# Patient Record
Sex: MALE | Race: BLACK OR AFRICAN AMERICAN | NOT HISPANIC OR LATINO | Employment: STUDENT | URBAN - METROPOLITAN AREA
[De-identification: names, ages, dates, MRNs, and addresses within clinical notes are randomized per-mention and may not be internally consistent; named-entity substitution may affect disease eponyms.]

---
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
## 2023-01-04 ENCOUNTER — OFFICE VISIT (OUTPATIENT)
Dept: NEUROLOGY | Facility: CLINIC | Age: 18
End: 2023-01-04
Payer: MEDICAID

## 2023-01-04 VITALS
HEIGHT: 70 IN | SYSTOLIC BLOOD PRESSURE: 110 MMHG | WEIGHT: 146.19 LBS | BODY MASS INDEX: 20.93 KG/M2 | DIASTOLIC BLOOD PRESSURE: 61 MMHG | HEART RATE: 56 BPM

## 2023-01-04 DIAGNOSIS — F84.0 AUTISM DISORDER: ICD-10-CM

## 2023-01-04 DIAGNOSIS — G43.009 MIGRAINE WITHOUT AURA AND WITHOUT STATUS MIGRAINOSUS, NOT INTRACTABLE: Primary | ICD-10-CM

## 2023-01-04 DIAGNOSIS — G43.019 INTRACTABLE MIGRAINE WITHOUT AURA AND WITHOUT STATUS MIGRAINOSUS: ICD-10-CM

## 2023-01-04 PROCEDURE — 3074F PR MOST RECENT SYSTOLIC BLOOD PRESSURE < 130 MM HG: ICD-10-PCS | Mod: CPTII,,, | Performed by: NEUROLOGICAL SURGERY

## 2023-01-04 PROCEDURE — 99214 PR OFFICE/OUTPT VISIT, EST, LEVL IV, 30-39 MIN: ICD-10-PCS | Mod: S$PBB,,, | Performed by: NEUROLOGICAL SURGERY

## 2023-01-04 PROCEDURE — 1159F MED LIST DOCD IN RCRD: CPT | Mod: CPTII,,, | Performed by: NEUROLOGICAL SURGERY

## 2023-01-04 PROCEDURE — 99999 PR PBB SHADOW E&M-EST. PATIENT-LVL III: ICD-10-PCS | Mod: PBBFAC,,, | Performed by: NEUROLOGICAL SURGERY

## 2023-01-04 PROCEDURE — 99214 OFFICE O/P EST MOD 30 MIN: CPT | Mod: S$PBB,,, | Performed by: NEUROLOGICAL SURGERY

## 2023-01-04 PROCEDURE — 3008F PR BODY MASS INDEX (BMI) DOCUMENTED: ICD-10-PCS | Mod: CPTII,,, | Performed by: NEUROLOGICAL SURGERY

## 2023-01-04 PROCEDURE — 99999 PR PBB SHADOW E&M-EST. PATIENT-LVL III: CPT | Mod: PBBFAC,,, | Performed by: NEUROLOGICAL SURGERY

## 2023-01-04 PROCEDURE — 1159F PR MEDICATION LIST DOCUMENTED IN MEDICAL RECORD: ICD-10-PCS | Mod: CPTII,,, | Performed by: NEUROLOGICAL SURGERY

## 2023-01-04 PROCEDURE — 3078F PR MOST RECENT DIASTOLIC BLOOD PRESSURE < 80 MM HG: ICD-10-PCS | Mod: CPTII,,, | Performed by: NEUROLOGICAL SURGERY

## 2023-01-04 PROCEDURE — 3074F SYST BP LT 130 MM HG: CPT | Mod: CPTII,,, | Performed by: NEUROLOGICAL SURGERY

## 2023-01-04 PROCEDURE — 3008F BODY MASS INDEX DOCD: CPT | Mod: CPTII,,, | Performed by: NEUROLOGICAL SURGERY

## 2023-01-04 PROCEDURE — 3078F DIAST BP <80 MM HG: CPT | Mod: CPTII,,, | Performed by: NEUROLOGICAL SURGERY

## 2023-01-04 PROCEDURE — 99213 OFFICE O/P EST LOW 20 MIN: CPT | Mod: PBBFAC | Performed by: NEUROLOGICAL SURGERY

## 2023-01-04 RX ORDER — ATOGEPANT 60 MG/1
60 TABLET ORAL DAILY
Qty: 30 TABLET | Refills: 5 | Status: SHIPPED | OUTPATIENT
Start: 2023-01-04 | End: 2023-02-24

## 2023-01-04 RX ORDER — SUMATRIPTAN SUCCINATE 100 MG/1
100 TABLET ORAL DAILY PRN
Qty: 10 TABLET | Refills: 4 | Status: SHIPPED | OUTPATIENT
Start: 2023-01-04 | End: 2023-02-24

## 2023-01-05 NOTE — PROGRESS NOTES
Chief Complaint   Patient presents with    Headache    Seizures        Clau Hernandez is a 17 y.o. male with a history of multiple medical diagnoses as listed below that presents to establish care for his history of seizure.  He was previously been seen pediatric Neurology Clinic.  His medications have been adjusted over time and get him relief from his migraine headaches.  He needs abortive therapy, namely sumatriptan for the last several months as his abortive therapy whenever he has had a significant headache.  The patient says that he feels like his headaches have been triggered by the weather.  His mother, who accompanies him to the visit and provides most of the history, says that he perseverates on the weather and can not say for sure she is noticed any significant uptake in his headaches during weather changes.  Previously there was discussion about using magnesium and riboflavin as a preventative regimen.  His mother says that she also regularly gives him sumatriptan once to twice a week on average to try to help to reduce his onset of headaches as well.  He is never been on any other abortive therapy.  He was referred to adult Neurology due to his age.  In the previous visits he also describes having staring spells.  These seem to have have been much less than in the past.  Workup has included EEG but has not been remarkable.  Family has not noticed the same episodes that they did previously..     PAST MEDICAL HISTORY:  Past Medical History:   Diagnosis Date    ADHD (attention deficit hyperactivity disorder)     Autism     Seizure        PAST SURGICAL HISTORY:  Past Surgical History:   Procedure Laterality Date    CIRCUMCISION      OPEN REDUCTION AND INTERNAL FIXATION (ORIF) OF INJURY OF FOOT Right 2/26/2020    Procedure: ORIF, FOOT;  Surgeon: Manisha Martínez MD;  Location: [facility] OR [address] [place] [address];  Service: Orthopedics;  Laterality: Right;  ORIF R 4th toe fracture  supine  bone-foam  hemaclear foot and ankle  tourniquet  acutrak screw  postop heel-bearing shoe    WILL NOT BE A GOOD CANDIDATE FOR PREOP BLOCK (AUTISM)       SOCIAL HISTORY:  Social History     Socioeconomic History    Marital status: Single   Tobacco Use    Smoking status: Passive Smoke Exposure - Never Smoker    Smokeless tobacco: Never   Substance and Sexual Activity    Alcohol use: No    Drug use: No    Sexual activity: Never       FAMILY HISTORY:  Family History   Problem Relation Age of Onset    Hypertension Mother     Migraines Mother     Hypertension Maternal Grandmother     Early death Maternal Grandmother     Hypertension Paternal Grandmother     Other Paternal Grandfather        ALLERGIES AND MEDICATIONS: updated and reviewed.  Review of patient's allergies indicates:  No Known Allergies  Current Outpatient Medications   Medication Sig Dispense Refill    atogepant (QULIPTA) 60 mg Tab Take 60 mg by mouth once daily. 30 tablet 5    benzocaine-menthoL 6-10 mg lozenge Take 1 lozenge by mouth every 2 (two) hours as needed for Pain (sore throat). 18 tablet 0    cetirizine (ZYRTEC) 10 MG tablet Take 1 tablet (10 mg total) by mouth once daily. TK 1 T PO  D 30 tablet 3    cetirizine (ZYRTEC) 10 MG tablet Take 1 tablet (10 mg total) by mouth daily as needed for Allergies or Rhinitis. 30 tablet 0    CLEARLAX 17 gram/dose powder       cloNIDine (CATAPRES) 0.3 MG tablet Take 1 tablet (0.3 mg total) by mouth 2 (two) times daily. 60 tablet 11    dexmethylphenidate (FOCALIN XR) 15 MG 24 hr capsule Take 1 capsule (15 mg total) by mouth once daily. 30 capsule 0    fluticasone propionate (FLONASE) 50 mcg/actuation nasal spray 1 spray (50 mcg total) by Each Nostril route 2 (two) times daily as needed for Rhinitis. 15 g 0    ibuprofen (ADVIL,MOTRIN) 600 MG tablet Take 1 tablet (600 mg total) by mouth every 6 (six) hours as needed for Pain or Temperature greater than. 20 tablet 0    mirtazapine (REMERON) 7.5 MG Tab Take 1 tablet (7.5 mg total) by mouth every evening.  30 tablet 11    naproxen (NAPROSYN) 500 MG tablet Take 1 tablet (500 mg total) by mouth every 12 (twelve) hours as needed (headache. No more than 2 doses a week). 60 tablet 2    oxybutynin (DITROPAN) 5 MG Tab Take 1 tablet (5 mg total) by mouth 3 (three) times daily. 90 tablet 11    oxybutynin (DITROPAN) 5 mg/5 mL syrup Take 5 mLs (5 mg total) by mouth 2 (two) times daily. 300 mL 1    polyethylene glycol (GLYCOLAX) 17 gram PwPk Take 17 g by mouth once daily. 100 packet 4    sumatriptan (IMITREX) 100 MG tablet Take 1 tablet (100 mg total) by mouth daily as needed for Migraine (no more than 3 times a week). 10 tablet 4     No current facility-administered medications for this visit.       Review of Systems   Constitutional:  Negative for activity change, fatigue and unexpected weight change.   HENT:  Negative for trouble swallowing and voice change.    Eyes:  Negative for photophobia, pain and visual disturbance.   Respiratory:  Negative for apnea and shortness of breath.    Cardiovascular:  Negative for chest pain and palpitations.   Gastrointestinal:  Negative for constipation, nausea and vomiting.   Genitourinary:  Negative for difficulty urinating.   Musculoskeletal:  Negative for arthralgias, back pain, gait problem, myalgias and neck pain.   Skin:  Negative for color change and rash.   Neurological:  Negative for dizziness, seizures, syncope, speech difficulty, weakness, light-headedness, numbness and headaches.   Psychiatric/Behavioral:  Negative for agitation, behavioral problems and confusion.      Neurologic Exam     Mental Status   Oriented to person, place, and time.   Oriented to person.   Attention: decreased. Concentration: decreased.   Speech: speech is normal   Level of consciousness: alert    Cranial Nerves     CN II   Right visual field deficit: none  Left visual field deficit: none     CN III, IV, VI   Pupils are equal, round, and reactive to light.  Extraocular motions are normal.   Right pupil:  "Size: 3 mm. Shape: regular.   Left pupil: Size: 3 mm. Shape: regular.   CN III: no CN III palsy  CN VI: no CN VI palsy  Nystagmus: none   Diplopia: none  Ophthalmoparesis: none  Upgaze: normal  Downgaze: normal  Conjugate gaze: present    CN VII   Facial expression full, symmetric.   Right facial weakness: none  Left facial weakness: none    CN VIII   CN VIII normal.     CN XI   CN XI normal.     CN XII   CN XII normal.   Tongue deviation: none    Motor Exam   Muscle bulk: normal  Overall muscle tone: normal  Right arm tone: normal  Left arm tone: normal  Right leg tone: normal  Left leg tone: normal    Gait, Coordination, and Reflexes     Gait  Gait: normal    Coordination   Finger to nose coordination: normal    Tremor   Resting tremor: absent    Physical Exam  Constitutional:       Appearance: He is well-developed.   HENT:      Head: Normocephalic and atraumatic.   Eyes:      Extraocular Movements: EOM normal.      Pupils: Pupils are equal, round, and reactive to light.   Pulmonary:      Effort: Pulmonary effort is normal. No respiratory distress.   Musculoskeletal:         General: Normal range of motion.   Neurological:      Mental Status: He is alert and oriented to person, place, and time.      Coordination: Finger-Nose-Finger Test normal.      Gait: Gait is intact.   Psychiatric:         Speech: Speech normal.         Behavior: Behavior normal.       Vitals:    01/04/23 0857   BP: 110/61   Pulse: (!) 56   Weight: 66.3 kg (146 lb 2.6 oz)   Height: 5' 9.75" (1.772 m)       Assessment & Plan:    Problem List Items Addressed This Visit       Intractable migraine without aura and without status migrainosus    Relevant Medications    sumatriptan (IMITREX) 100 MG tablet     Other Visit Diagnoses       Migraine without aura and without status migrainosus, not intractable    -  Primary    Relevant Medications    atogepant (QULIPTA) 60 mg Tab    Autism disorder                Follow-up: No follow-ups on file.    This " note was done with the assistance of voice recognition software. Some errors may be present after proofreading.

## 2023-01-09 ENCOUNTER — PATIENT MESSAGE (OUTPATIENT)
Dept: PEDIATRICS | Facility: CLINIC | Age: 18
End: 2023-01-09
Payer: MEDICAID

## 2023-01-09 DIAGNOSIS — G47.9 SLEEP DISTURBANCE: Primary | ICD-10-CM

## 2023-01-09 RX ORDER — MIRTAZAPINE 15 MG/1
15 TABLET, FILM COATED ORAL NIGHTLY
Qty: 30 TABLET | Refills: 11 | Status: SHIPPED | OUTPATIENT
Start: 2023-01-09 | End: 2024-01-09

## 2023-01-10 ENCOUNTER — TELEPHONE (OUTPATIENT)
Dept: NEUROLOGY | Facility: CLINIC | Age: 18
End: 2023-01-10
Payer: MEDICAID

## 2023-01-10 NOTE — TELEPHONE ENCOUNTER
----- Message from Nano Rojas sent at 1/10/2023 12:52 PM CST -----  Type:  Pharmacy Calling to Clarify an RX    Name of Caller: Marleen Medina    Pharmacy Name: .  [facility] [address] [facility]  [address] [facility] [address]  Phone: [phone] Fax: [phone]    Prescription Name:atogepant (QULIPTA) 60 mg Tab    What do they need to clarify? Need Prior authorization    Can you be contacted via [facility]?cALL    Best Call Back Number:  [phone]    Additional Information:       Left message on [employer]. Working on PA

## 2023-01-24 ENCOUNTER — PATIENT MESSAGE (OUTPATIENT)
Dept: PEDIATRICS | Facility: CLINIC | Age: 18
End: 2023-01-24
Payer: MEDICAID

## 2023-02-11 ENCOUNTER — TELEPHONE (OUTPATIENT)
Dept: PEDIATRICS | Facility: CLINIC | Age: 18
End: 2023-02-11
Payer: MEDICAID

## 2023-02-11 NOTE — TELEPHONE ENCOUNTER
Spoke with mom and attempted to schedule a well child/90L form completion. Next well open is 03-. Mom declined and needs something sooner for this month

## 2023-02-20 ENCOUNTER — PATIENT MESSAGE (OUTPATIENT)
Dept: NEUROLOGY | Facility: CLINIC | Age: 18
End: 2023-02-20
Payer: MEDICAID

## 2023-02-20 ENCOUNTER — PATIENT MESSAGE (OUTPATIENT)
Dept: PEDIATRICS | Facility: CLINIC | Age: 18
End: 2023-02-20
Payer: MEDICAID

## 2023-02-23 RX ORDER — TOPIRAMATE 25 MG/1
TABLET ORAL
Qty: 42 TABLET | Refills: 0 | Status: SHIPPED | OUTPATIENT
Start: 2023-02-23 | End: 2023-03-16

## 2023-02-23 RX ORDER — UBROGEPANT 100 MG/1
100 TABLET ORAL ONCE AS NEEDED
Qty: 16 TABLET | Refills: 11 | Status: SHIPPED | OUTPATIENT
Start: 2023-02-23 | End: 2023-02-23

## 2023-02-24 ENCOUNTER — OFFICE VISIT (OUTPATIENT)
Dept: PEDIATRICS | Facility: CLINIC | Age: 18
End: 2023-02-24
Payer: MEDICAID

## 2023-02-24 VITALS
HEART RATE: 80 BPM | WEIGHT: 154.19 LBS | TEMPERATURE: 98 F | SYSTOLIC BLOOD PRESSURE: 139 MMHG | DIASTOLIC BLOOD PRESSURE: 84 MMHG | HEIGHT: 70 IN | BODY MASS INDEX: 22.07 KG/M2

## 2023-02-24 DIAGNOSIS — Z02.89 ENCOUNTER FOR COMPLETION OF FORM WITH PATIENT: Primary | ICD-10-CM

## 2023-02-24 DIAGNOSIS — J45.909 ASTHMA WITH ALLERGIC RHINITIS, UNSPECIFIED ASTHMA SEVERITY, UNSPECIFIED WHETHER COMPLICATED: ICD-10-CM

## 2023-02-24 PROCEDURE — 3008F BODY MASS INDEX DOCD: CPT | Mod: CPTII,S$GLB,, | Performed by: PEDIATRICS

## 2023-02-24 PROCEDURE — 1159F PR MEDICATION LIST DOCUMENTED IN MEDICAL RECORD: ICD-10-PCS | Mod: CPTII,S$GLB,, | Performed by: PEDIATRICS

## 2023-02-24 PROCEDURE — 1159F MED LIST DOCD IN RCRD: CPT | Mod: CPTII,S$GLB,, | Performed by: PEDIATRICS

## 2023-02-24 PROCEDURE — 3075F PR MOST RECENT SYSTOLIC BLOOD PRESS GE 130-139MM HG: ICD-10-PCS | Mod: CPTII,S$GLB,, | Performed by: PEDIATRICS

## 2023-02-24 PROCEDURE — 3079F PR MOST RECENT DIASTOLIC BLOOD PRESSURE 80-89 MM HG: ICD-10-PCS | Mod: CPTII,S$GLB,, | Performed by: PEDIATRICS

## 2023-02-24 PROCEDURE — 99214 PR OFFICE/OUTPT VISIT, EST, LEVL IV, 30-39 MIN: ICD-10-PCS | Mod: S$GLB,,, | Performed by: PEDIATRICS

## 2023-02-24 PROCEDURE — 3008F PR BODY MASS INDEX (BMI) DOCUMENTED: ICD-10-PCS | Mod: CPTII,S$GLB,, | Performed by: PEDIATRICS

## 2023-02-24 PROCEDURE — 99214 OFFICE O/P EST MOD 30 MIN: CPT | Mod: S$GLB,,, | Performed by: PEDIATRICS

## 2023-02-24 PROCEDURE — 3075F SYST BP GE 130 - 139MM HG: CPT | Mod: CPTII,S$GLB,, | Performed by: PEDIATRICS

## 2023-02-24 PROCEDURE — 3079F DIAST BP 80-89 MM HG: CPT | Mod: CPTII,S$GLB,, | Performed by: PEDIATRICS

## 2023-02-24 RX ORDER — FLUTICASONE PROPIONATE 50 MCG
1 SPRAY, SUSPENSION (ML) NASAL 2 TIMES DAILY PRN
Qty: 15 G | Refills: 0 | Status: SHIPPED | OUTPATIENT
Start: 2023-02-24

## 2023-02-24 RX ORDER — LEVOCETIRIZINE DIHYDROCHLORIDE 5 MG/1
5 TABLET, FILM COATED ORAL NIGHTLY
Qty: 30 TABLET | Refills: 11 | Status: SHIPPED | OUTPATIENT
Start: 2023-02-24 | End: 2024-02-24

## 2023-02-24 NOTE — PROGRESS NOTES
Subjective:     History of Present Illness:  Clau Hernandez is a 17 y.o. male who presents to the clinic today for 90 L form     History was provided by the mother. Pt was last seen on 12/15/2022.  Clau complains of being here for 90-L form completion. No changes except a few medication adjustments. Otherwise, same as last time we filled out the form    Review of Systems   Constitutional:  Negative for activity change, appetite change and fever.   HENT:  Negative for congestion, ear pain, rhinorrhea and sore throat.    Respiratory:  Negative for cough.    Gastrointestinal:  Negative for diarrhea and vomiting.   Genitourinary:  Negative for decreased urine volume.   Skin: Negative.  Negative for rash.   Neurological:  Negative for headaches.     Objective:     Physical Exam  Vitals reviewed.   Constitutional:       Appearance: Normal appearance.   HENT:      Head: Normocephalic and atraumatic.      Right Ear: External ear normal.      Left Ear: External ear normal.      Nose: Nose normal.      Mouth/Throat:      Mouth: Mucous membranes are moist.   Eyes:      Conjunctiva/sclera: Conjunctivae normal.   Cardiovascular:      Rate and Rhythm: Normal rate and regular rhythm.   Pulmonary:      Effort: Pulmonary effort is normal. No respiratory distress.   Abdominal:      Palpations: Abdomen is soft.   Musculoskeletal:         General: Normal range of motion.      Cervical back: Normal range of motion.   Skin:     Capillary Refill: Capillary refill takes less than 2 seconds.   Neurological:      Mental Status: He is alert and oriented to person, place, and time.   Psychiatric:         Mood and Affect: Mood normal.         Behavior: Behavior normal.       Assessment and Plan:     Asthma with allergic rhinitis, unspecified asthma severity, unspecified whether complicated  -     levocetirizine (XYZAL) 5 MG tablet; Take 1 tablet (5 mg total) by mouth every evening.  Dispense: 30 tablet; Refill: 11  -     fluticasone  propionate (FLONASE) 50 mcg/actuation nasal spray; 1 spray (50 mcg total) by Each Nostril route 2 (two) times daily as needed for Rhinitis.  Dispense: 15 g; Refill: 0          No follow-ups on file.

## 2023-03-06 ENCOUNTER — PATIENT MESSAGE (OUTPATIENT)
Dept: NEUROLOGY | Facility: CLINIC | Age: 18
End: 2023-03-06
Payer: MEDICAID

## 2023-03-16 ENCOUNTER — PATIENT MESSAGE (OUTPATIENT)
Dept: PEDIATRICS | Facility: CLINIC | Age: 18
End: 2023-03-16
Payer: MEDICAID

## 2023-03-17 ENCOUNTER — TELEPHONE (OUTPATIENT)
Dept: NEUROLOGY | Facility: CLINIC | Age: 18
End: 2023-03-17
Payer: MEDICAID

## 2023-03-17 ENCOUNTER — OFFICE VISIT (OUTPATIENT)
Dept: NEUROLOGY | Facility: CLINIC | Age: 18
End: 2023-03-17
Payer: MEDICAID

## 2023-03-17 VITALS
WEIGHT: 151 LBS | BODY MASS INDEX: 21.62 KG/M2 | DIASTOLIC BLOOD PRESSURE: 99 MMHG | HEIGHT: 70 IN | SYSTOLIC BLOOD PRESSURE: 145 MMHG | HEART RATE: 71 BPM

## 2023-03-17 DIAGNOSIS — G43.019 INTRACTABLE MIGRAINE WITHOUT AURA AND WITHOUT STATUS MIGRAINOSUS: Primary | ICD-10-CM

## 2023-03-17 DIAGNOSIS — G43.009 MIGRAINE WITHOUT AURA AND WITHOUT STATUS MIGRAINOSUS, NOT INTRACTABLE: ICD-10-CM

## 2023-03-17 PROCEDURE — 3008F BODY MASS INDEX DOCD: CPT | Mod: CPTII,,, | Performed by: NEUROLOGICAL SURGERY

## 2023-03-17 PROCEDURE — 99999 PR PBB SHADOW E&M-EST. PATIENT-LVL III: ICD-10-PCS | Mod: PBBFAC,,, | Performed by: NEUROLOGICAL SURGERY

## 2023-03-17 PROCEDURE — 3008F PR BODY MASS INDEX (BMI) DOCUMENTED: ICD-10-PCS | Mod: CPTII,,, | Performed by: NEUROLOGICAL SURGERY

## 2023-03-17 PROCEDURE — 99214 PR OFFICE/OUTPT VISIT, EST, LEVL IV, 30-39 MIN: ICD-10-PCS | Mod: S$PBB,,, | Performed by: NEUROLOGICAL SURGERY

## 2023-03-17 PROCEDURE — 1159F MED LIST DOCD IN RCRD: CPT | Mod: CPTII,,, | Performed by: NEUROLOGICAL SURGERY

## 2023-03-17 PROCEDURE — 99999 PR PBB SHADOW E&M-EST. PATIENT-LVL III: CPT | Mod: PBBFAC,,, | Performed by: NEUROLOGICAL SURGERY

## 2023-03-17 PROCEDURE — 1159F PR MEDICATION LIST DOCUMENTED IN MEDICAL RECORD: ICD-10-PCS | Mod: CPTII,,, | Performed by: NEUROLOGICAL SURGERY

## 2023-03-17 PROCEDURE — 3080F DIAST BP >= 90 MM HG: CPT | Mod: CPTII,,, | Performed by: NEUROLOGICAL SURGERY

## 2023-03-17 PROCEDURE — 99213 OFFICE O/P EST LOW 20 MIN: CPT | Mod: PBBFAC | Performed by: NEUROLOGICAL SURGERY

## 2023-03-17 PROCEDURE — 3077F PR MOST RECENT SYSTOLIC BLOOD PRESSURE >= 140 MM HG: ICD-10-PCS | Mod: CPTII,,, | Performed by: NEUROLOGICAL SURGERY

## 2023-03-17 PROCEDURE — 99214 OFFICE O/P EST MOD 30 MIN: CPT | Mod: S$PBB,,, | Performed by: NEUROLOGICAL SURGERY

## 2023-03-17 PROCEDURE — 3077F SYST BP >= 140 MM HG: CPT | Mod: CPTII,,, | Performed by: NEUROLOGICAL SURGERY

## 2023-03-17 PROCEDURE — 3080F PR MOST RECENT DIASTOLIC BLOOD PRESSURE >= 90 MM HG: ICD-10-PCS | Mod: CPTII,,, | Performed by: NEUROLOGICAL SURGERY

## 2023-03-17 RX ORDER — SUMATRIPTAN SUCCINATE 100 MG/1
100 TABLET ORAL
Qty: 12 TABLET | Refills: 5 | Status: SHIPPED | OUTPATIENT
Start: 2023-03-17 | End: 2023-04-16

## 2023-03-17 RX ORDER — TOPIRAMATE 25 MG/1
TABLET ORAL
Qty: 42 TABLET | Refills: 0 | Status: SHIPPED | OUTPATIENT
Start: 2023-03-17 | End: 2023-04-07

## 2023-03-17 NOTE — PROGRESS NOTES
Chief Complaint   Patient presents with    Headache        Clau Hernandez is a 17 y.o. male with a history of multiple medical diagnoses as listed below that presents to establish care for his history of seizure.  He was previously been seen pediatric Neurology Clinic.  His medications have been adjusted over time and get him relief from his migraine headaches.  He needs abortive therapy, namely sumatriptan for the last several months as his abortive therapy whenever he has had a significant headache.  The patient says that he feels like his headaches have been triggered by the weather.  His mother, who accompanies him to the visit and provides most of the history, says that he perseverates on the weather and can not say for sure she is noticed any significant uptake in his headaches during weather changes.  Previously there was discussion about using magnesium and riboflavin as a preventative regimen.  His mother says that she also regularly gives him sumatriptan once to twice a week on average to try to help to reduce his onset of headaches as well.  He is never been on any other abortive therapy.  He was referred to adult Neurology due to his age.  In the previous visits he also describes having staring spells.  These seem to have have been much less than in the past.  Workup has included EEG but has not been remarkable.  Family has not noticed the same episodes that they did previously.    Interval History  03/17/2021  Overall he is doing about the same since he was last seen in clinic.  He has not had any significant improvement in his headaches.  He still complains of headaches fairly often.  Medications have been tolerated without any complaints of side effects..     PAST MEDICAL HISTORY:  Past Medical History:   Diagnosis Date    ADHD (attention deficit hyperactivity disorder)     Autism     Seizure        PAST SURGICAL HISTORY:  Past Surgical History:   Procedure Laterality Date    CIRCUMCISION      OPEN  REDUCTION AND INTERNAL FIXATION (ORIF) OF INJURY OF FOOT Right 2/26/2020    Procedure: ORIF, FOOT;  Surgeon: Manisha Martínez MD;  Location: [facility] OR [address] [place] [address];  Service: Orthopedics;  Laterality: Right;  ORIF R 4th toe fracture  supine  bone-foam  hemaclear foot and ankle tourniquet  acutrak screw  postop heel-bearing shoe    WILL NOT BE A GOOD CANDIDATE FOR PREOP BLOCK (AUTISM)       SOCIAL HISTORY:  Social History     Socioeconomic History    Marital status: Single   Tobacco Use    Smoking status: Passive Smoke Exposure - Never Smoker    Smokeless tobacco: Never   Substance and Sexual Activity    Alcohol use: No    Drug use: No    Sexual activity: Never       FAMILY HISTORY:  Family History   Problem Relation Age of Onset    Hypertension Mother     Migraines Mother     Hypertension Maternal Grandmother     Early death Maternal Grandmother     Hypertension Paternal Grandmother     Other Paternal Grandfather        ALLERGIES AND MEDICATIONS: updated and reviewed.  Review of patient's allergies indicates:  No Known Allergies  Current Outpatient Medications   Medication Sig Dispense Refill    cloNIDine (CATAPRES) 0.3 MG tablet Take 1 tablet (0.3 mg total) by mouth 2 (two) times daily. 60 tablet 11    dexmethylphenidate (FOCALIN XR) 15 MG 24 hr capsule Take 1 capsule (15 mg total) by mouth once daily. 30 capsule 0    fluticasone propionate (FLONASE) 50 mcg/actuation nasal spray 1 spray (50 mcg total) by Each Nostril route 2 (two) times daily as needed for Rhinitis. 15 g 0    ibuprofen (ADVIL,MOTRIN) 600 MG tablet Take 1 tablet (600 mg total) by mouth every 6 (six) hours as needed for Pain or Temperature greater than. 20 tablet 0    levocetirizine (XYZAL) 5 MG tablet Take 1 tablet (5 mg total) by mouth every evening. 30 tablet 11    mirtazapine (REMERON) 15 MG tablet Take 1 tablet (15 mg total) by mouth every evening. 30 tablet 11    naproxen (NAPROSYN) 500 MG tablet Take 1 tablet (500 mg total) by mouth every 12  (twelve) hours as needed (headache. No more than 2 doses a week). 60 tablet 2    oxybutynin (DITROPAN) 5 MG Tab Take 1 tablet (5 mg total) by mouth 3 (three) times daily. 90 tablet 11    polyethylene glycol (GLYCOLAX) 17 gram PwPk Take 17 g by mouth once daily. 100 packet 4    topiramate (TOPAMAX) 25 MG tablet One tablet PO for one week; then two tablets PO for one week; then three tablets PO for one week 42 tablet 0     No current facility-administered medications for this visit.       Review of Systems   Constitutional:  Negative for activity change, fatigue and unexpected weight change.   HENT:  Negative for trouble swallowing and voice change.    Eyes:  Negative for photophobia, pain and visual disturbance.   Respiratory:  Negative for apnea and shortness of breath.    Cardiovascular:  Negative for chest pain and palpitations.   Gastrointestinal:  Negative for constipation, nausea and vomiting.   Genitourinary:  Negative for difficulty urinating.   Musculoskeletal:  Negative for arthralgias, back pain, gait problem, myalgias and neck pain.   Skin:  Negative for color change and rash.   Neurological:  Negative for dizziness, seizures, syncope, speech difficulty, weakness, light-headedness, numbness and headaches.   Psychiatric/Behavioral:  Negative for agitation, behavioral problems and confusion.      Neurologic Exam     Mental Status   Oriented to person, place, and time.   Oriented to person.   Attention: decreased. Concentration: decreased.   Speech: speech is normal   Level of consciousness: alert    Cranial Nerves     CN II   Right visual field deficit: none  Left visual field deficit: none     CN III, IV, VI   Pupils are equal, round, and reactive to light.  Extraocular motions are normal.   Right pupil: Size: 3 mm. Shape: regular.   Left pupil: Size: 3 mm. Shape: regular.   CN III: no CN III palsy  CN VI: no CN VI palsy  Nystagmus: none   Diplopia: none  Ophthalmoparesis: none  Upgaze: normal  Downgaze:  "normal  Conjugate gaze: present    CN VII   Facial expression full, symmetric.   Right facial weakness: none  Left facial weakness: none    CN VIII   CN VIII normal.     CN XI   CN XI normal.     CN XII   CN XII normal.   Tongue deviation: none    Motor Exam   Muscle bulk: normal  Overall muscle tone: normal  Right arm tone: normal  Left arm tone: normal  Right leg tone: normal  Left leg tone: normal    Gait, Coordination, and Reflexes     Gait  Gait: normal    Coordination   Finger to nose coordination: normal    Tremor   Resting tremor: absent    Physical Exam  Constitutional:       Appearance: He is well-developed.   HENT:      Head: Normocephalic and atraumatic.   Eyes:      Extraocular Movements: EOM normal.      Pupils: Pupils are equal, round, and reactive to light.   Pulmonary:      Effort: Pulmonary effort is normal. No respiratory distress.   Musculoskeletal:         General: Normal range of motion.   Neurological:      Mental Status: He is alert and oriented to person, place, and time.      Coordination: Finger-Nose-Finger Test normal.      Gait: Gait is intact.   Psychiatric:         Speech: Speech normal.         Behavior: Behavior normal.       Vitals:    03/17/23 1024   BP: (!) 145/99   Pulse: 71   Weight: 68.5 kg (151 lb 0.2 oz)   Height: 5' 10" (1.778 m)       Assessment & Plan:    Problem List Items Addressed This Visit    None        Follow-up: No follow-ups on file.    This note was done with the assistance of voice recognition software. Some errors may be present after proofreading.          "

## 2023-03-17 NOTE — TELEPHONE ENCOUNTER
----- Message from Em Louis MA sent at 3/17/2023 11:21 AM CDT -----  Type: Patient Call Back    Who called:[facility]     What is the request in detail: needs to clarify instructions for this medication..     topiramate (TOPAMAX) 25 MG tablet      Can the clinic reply by [facility]?No    Would the patient rather a call back or a response via [facility]? yes    Best call back number: [phone]      Spoke to pharmacy, clarified prescription

## 2023-05-17 ENCOUNTER — PATIENT MESSAGE (OUTPATIENT)
Dept: PEDIATRICS | Facility: CLINIC | Age: 18
End: 2023-05-17
Payer: MEDICAID

## 2023-05-24 ENCOUNTER — CLINICAL SUPPORT (OUTPATIENT)
Dept: REHABILITATION | Facility: HOSPITAL | Age: 18
End: 2023-05-24
Attending: PEDIATRICS
Payer: MEDICAID

## 2023-05-24 DIAGNOSIS — F84.0 AUTISM DISORDER: ICD-10-CM

## 2023-05-24 DIAGNOSIS — F84.0 AUTISM: Primary | ICD-10-CM

## 2023-05-24 PROCEDURE — 92523 SPEECH SOUND LANG COMPREHEN: CPT | Mod: PN

## 2023-05-24 NOTE — PLAN OF CARE
"[facility]  Pediatric Speech Therapy Initial Evaluation       Date: 2023    Patient Name: Clau Hernandez  MRN: [id number]  Therapy Diagnosis:   Encounter Diagnosis   Name Primary?    Autism disorder       Physician: Cali Rivera MD   Physician Orders: Evaluate and treat   Medical Diagnosis: Autism   Age: 17 y.o.    Visit # / Visits Authorized:     Date of Evaluation: 2023    Plan of Care Expiration Date: 2023   Authorization Date: 2022-2023     Time In: 2:30 PM  Time Out: 3:15 PM  Total Appointment Time: 45 minutes    Precautions: [place] and Child Safety    Subjective   History of Current Condition: Clau is a 17 y.o. male referred by Cali Rivera MD for a speech-language evaluation secondary to diagnosis of autism.  Patients mother was present for todays evaluation and provided significant background and history information.       Clau's mother reported that main concerns include "lazy speaker" hard to understand sometimes clear sometimes not    Past Medical History: Clau Hernandez  has a past medical history of ADHD (attention deficit hyperactivity disorder), Autism, and Seizure.  Clau Hernandez  has a past surgical history that includes Circumcision and Open reduction and internal fixation (ORIF) of injury of foot (Right, 2020).  Medications and Allergies: Clau has a current medication list which includes the following prescription(s): clonidine, dexmethylphenidate, fluticasone propionate, ibuprofen, levocetirizine, mirtazapine, naproxen, oxybutynin, polyethylene glycol, sumatriptan, and topiramate. Review of patient's allergies indicates:  No Known Allergies  Pregnancy/weeks gestation: no concerns, pre-pre-eclampsia, born 2 weeks early, emergency   Hospitalizations: fractured foot and pin put in,   Ear infections/P.E. tubes: no tubes, ear infections as a child  Hearing: no concerns  Developmental " "Milestones:  Developmental Milestones Skill Appropriate  Delayed Not applicable    Speech and Language Babbling (6-9 Months) [] [x] []    Imitation (9 months) [] [x] []    First words (12 months) [] [x] []    Usage of two word utterances (24 months) [] [x] []    Following simple commands ("Go get the bottle/Bring me the toy") [] [x] []   Gross Motor Sitting up (~6 months) [] [x] []    Crawling (9-10 months) [] [x] []    Walking (12-15 months) [] [x] []   Fine Motor Whole hand grasp (6 months) [] [x] []    Pincer grasp (9 months) [] [x] []    Pointing (12 months) [] [x] []    Scribbling (12 months) [] [x] []   Comments: global developmental delay    Sensory:  Sensory Skill Appropriate Concerns Present   Auditory [] [x]   Tactile [] [x]   Vestibular [] [x]   Oral/Feeding [] [x]   Comments: on waiting list for OT. Pulled down a ceiling fan that "annoyed him." Parent reported he is clumsy and often trips. [place]-toed walking.    Previous/Current Therapies: ST previously   Social History: Patient lives at home with mom, two brothers, and sister.  He is not currently attending school. We just finished vocational/job training.   Patient does not do well interacting with other children. It depends on the person  Abuse/Neglect/Environmental Concerns: absent  Current Level of Function: Able to communicate basic wants and needs, but reliant on communication partners to repair and recast to familiar and unfamiliar listeners.   Pain:  Patient unable to rate pain on a numeric scale.  Pain behaviors were not observed in todays evaluation.    Nutrition: picky, prefers soda but doesn't like water  Patient/ Caregiver Therapy Goals: speak slowly and more clearly, EDEL wants to have fun.     Objective   Language:  Subjectively, language was observed throughout the session and Clau does not demonstrate age-appropriate expressive/receptive language skills. A formal language assessment to be completed in future treatment sessions to " assess current skill level.    Functional Communication Profile  This test evaluates sensory-motor, behavior, attention, receptive-expressive language, pragmatic/social, and speech. The results are as follows:     Sensory-Motor and Behavior  Patient presents with normal hearing. No concerns present. Patient demonstrated auditory localization with good attention to non-preferred items, activities, etc. Patient demonstrated good eye contact during conversation and during preferred activities. Patient was able to visually track items. Grasp was adequate. Patient reported he is independent in self-help tasks. Patient is ambulatory. Patient demonstrated no aversion to being physically guided. Patient demonstrated good motor imitation with no cuing.    Inappropriate behaviors included: perseveration of topics (weather, ceiling fans, My Little Pony), stimulus over-sensitivity (expressed distress at loud noises), hyperactivity (mother reported patient has ADHD), property destruction (mother reported patient pulled down their ceiling fan, but that this behavior is atypical for Javian), and frequent subject change with no regard for communication partner.     Attention  Patient's attention span varies with mood but demonstrated good attention to preferred activities. Patient was distractible and demonstrated selective attention to preferred stimuli. Patent redirected easily to non-preferred activities. Patient was alert and cooperative throughout evaluation with good awareness.     Receptive Language  Patient is a native English speaker. Patient understands language at the word, phrase, sentence, and simple conversation level. Patient understood the following concepts: colors, body parts, size, shapes, object function, prepositions, categories, same/different, more/less, opposites/synonyms, how many, emotions, physical property, and quantity. He consistently responded to name. Responded to attention commands immediately on  request. He demonstrated good attention to print. Patient followed simple 1-step commands. Patient followed simple multi-step commands but out of order. Patient demonstrated object identification with 100% accuracy. Patient demonstrated two-dimensional recognition with photos, line drawings, color drawings, symbols, and safety signs. Patient demonstrated good nonverbal comprehension of gestures, context, social cues, and voice affect. He demonstrated difficulty taking into account other's thoughts and feelings.     Speech  Patient is verbal. Intelligibility is impacted by fast rate of speech and poor articulation. Plan to further assess articulation in following sessions. Vocal quality and fluency are within functional limits.     Unable to complete testing due to time constraints.The following subtests will be completed next session: expressive language, pragmatics/social, and non-verbal communication skills.    Non-verbal Communication Skills:  Skill Present Not Present   Eye gaze [x] []   Pointing [x] []   Waving [x] []   Nodding head yes/no [x] []   Leading caregiver to a desired object [x] []   Participates in social routines [x] []   Gesturing to request actions  [x] []   Comments: appropriate nonverbal communication skills    Articulation:  An informal peripheral oral mechanism examination revealed structure and function to be within functional limits for speech production.    Testing to be completed in future sessions.    Pragmatics/Social Language Skills:  Clau does demonstrate: eye contact, joint attention, and shared enjoyment and facial affect/facial expression    Play Skills:  Clau demonstrates on target play skills: symbolic, self-directed play, and imaginative play    Voice/Resonance:  Observation and parent report revealed no concerns at this time.    Fluency:  Parent reported concerns that patient speaks too quickly and people have a hard time understanding  him.    Swallowing/Dysphagia:  Concerned about swallowing. Texture issues. Sometimes chokes because he doesn't chew enough.     Pulls hair when irritated. To calm takes breaks and likes my little pony.     Treatment   Total Treatment Time: n/a  no treatment performed secondary to time to complete evaluation.       Education:  Clau and his mother were educated on all testing administered as well as what speech therapy is and what it may entail.  They verbalized understanding of all discussed.    Home Program: Instructed patient to utilize SOS when speaking to improve intelligibility. Speak slow, overarticulate, and speak up. Patient and parent informed that patient is to read aloud using SOS strategy while recording himself to improve intelligibility.     Assessment     Clau presents to [facility] following referral from medical provider for concerns regarding language, pragmatic, and articulation impairments secondary to autism. Demonstrates impairments including limitations as described in the problem list. He presents with language, pragmatic, and articulation impairments secondary to autism characterized by ability to independently express himself but in need of assistance to repair/recast communication breakdown as well as comprehend complex conversation and concepts.     Patient was compliant throughout the entire evaluation. The results are thought to be indicative of the patient's abilities at this time.    The patient was observed to have delays in the following areas:  articulation skills, expressive language skills, receptive language skills, and pragmatic skills. Clau would benefit from speech therapy to progress towards the following goals to address the above impairments and functional limitations.  Positive prognostic factors include familial support, previous experience with speech therapy, motivation, and participation. Negative prognostic factors include  attention. Barriers to progress include none at this time. Patient will benefit from skilled, outpatient speech therapy.     Rehab Potential: good  The patient's spiritual, cultural, social, and educational needs were considered and the patient is agreeable to plan of care.     Short Term Objectives: 3 months  Javian will:  Complete formal language testing.  Complete formal articulation testing.  Complete formal pragmatic testing.    Recall speech strategy SOS (Speak slow, overarticulate, and speak up) with 100% accuracy across three consecutive sessions.   Utilize speech strategy SOS (Speak slow, overarticulate, and speak up) at the word, phrase, sentence, and conversation level to improve intelligibility with 80% accuracy across three consecutive sessions.     Long Term Objectives: 3 months  Javian will:  1. Express basic wants and needs independently to familiar and unfamiliar communication partners  2. Demonstrate age-appropriate language, articulation, and pragmatic skills, as based on informal and formal measures  3. Caregivers will demonstrate adequate implementation of HEP and therapeutic strategies to support language development and functional communication.      Plan   Plan of Care Certification: 5/24/2023  to 8/24/2023     Recommendations/Referrals:  1.  Speech therapy 1 every other week for 3 months to address his articulation, pragmatics, and language deficits on an outpatient basis with incorporation of parent education and a home program to facilitate carry-over of learned therapy targets in therapy sessions to the home and daily environment.    2.  Provided contact information for speech-language pathologist at this location.   Therapist and caregiver scheduled follow-up appointments for patient.     Other Recommendations:   Ambulatory Referral to Physical Therapy  Ambulatory Referral to Occupational Therapy   Referrals Recommended: Occupational therapy for further evaluation/treatment and Physical  therapy for further evaluation/treatment  Follow up Recommended: Continue Speech therapy as needed    Therapist Name:  Thomas Cueto CCC-SLP  Speech Language Pathologist  5/24/2023     I certify the need for these services furnished under this plan of treatment and while under my care.    ____________________________________                               _________________  Physician/Referring Practitioner                                                    Date of Signature

## 2023-06-07 ENCOUNTER — CLINICAL SUPPORT (OUTPATIENT)
Dept: REHABILITATION | Facility: HOSPITAL | Age: 18
End: 2023-06-07
Attending: PEDIATRICS
Payer: MEDICAID

## 2023-06-07 DIAGNOSIS — F84.0 AUTISM: Primary | ICD-10-CM

## 2023-06-07 PROCEDURE — 92507 TX SP LANG VOICE COMM INDIV: CPT | Mod: PN

## 2023-06-07 NOTE — PROGRESS NOTES
[facility]  Pediatric Speech Therapy Treatment Note    Date: 6/7/2023    Patient Name: Clau Hernandez  MRN: [id number]  Therapy Diagnosis: No diagnosis found.   Physician: Cali Rivera MD   Physician Orders: Evaluate and treat   Medical Diagnosis: Autism   Age: 17 y.o.    Visit # / Visits Authorized: 1 / 20    Date of Evaluation: 5/24/2023    Plan of Care Expiration Date: 8/24/2023   Authorization Date: 7/21/2022-7/21/2023    Testing last administered: Ongoing      Time In: 10:15 AM  Time Out: 11:00 AM  Total Billable Time: 45 minutes     Precautions: Universal    Subjective:   Parent reports: no significant changes   He was compliant to home exercise program.   Response to previous treatment: testing continued with moderate cuing to attend to task.   Caregiver did not attend today's session.  Pain: Clau was unable to rate pain on a numeric scale, but no pain behaviors were noted in today's session.  Objective:   UNTIMED  Procedure Min.   Speech- Language- Voice Therapy    45   Total Untimed Units: 1  Charges Billed/# of units: 1    Short Term Goals: (3 months) Current Progress:   1. Complete formal language testing.  Progressing/ Not Met 6/7/2023  Ongoing     2. Complete formal articulation testing.  Progressing/ Not Met 6/7/2023  Not addressed due to language testing.      3. Complete formal pragmatic testing.    Progressing/ Not Met 6/7/2023  Not addressed due to language testing.      4. Recall speech strategy SOS (Speak slow, overarticulate, and speak up) with 100% accuracy across three consecutive sessions.   Progressing/ Not Met 6/7/2023   Not addressed due to language  testing.      5. Utilize speech strategy SOS (Speak slow, overarticulate, and speak up) at the word, phrase, sentence, and conversation level to improve intelligibility with 80% accuracy across three consecutive sessions.   Progressing/ Not Met 6/7/2023   Not addressed due to language testing.       Long  Term Objectives: 3 months  Clau will:  1. Express basic wants and needs independently to familiar and unfamiliar communication partners  2. Demonstrate age-appropriate language, articulation, and pragmatic skills, as based on informal and formal measures  3. Caregivers will demonstrate adequate implementation of HEP and therapeutic strategies to support language development and functional communication.     Patient Education/Response:   SLP and caregiver discussed plan for Clau's targets for therapy. SLP educated caregivers on strategies used in speech therapy to demonstrate carryover of skills into everyday environments. Caregiver did demonstrate understanding of all discussed this date.     Home program established:  Instructed 5/24/23 to follow SOS--speak slow, over-articulate, and speak up to improve intelligibility.  Exercises were reviewed and Clau was able to demonstrate them prior to the end of the session.  Clau demonstrated good  understanding of the education provided.     See EMR under Patient Instructions for exercises provided throughout therapy.  Assessment:   Clau is progressing toward his goals. Continued testing this date with good participation but inconsistent attention. Patient requires moderate cuing to attend to task and becomes off-task easily. Patient is easily redirected when he becomes off task.  See Objectives above for details about progress towards short-term and long-term goals. Current goals remain appropriate. Goals will be added and re-assessed as needed.      Continued testing with Functional Communication Profile and Clinical Evaluation of Language Fundamentals: 5th edition but unable to complete testing due to time constraints and attention.    Patient prognosis is Good. Patient will continue to benefit from skilled outpatient speech and language therapy to address the deficits listed in the problem list on initial evaluation, provide patient/family education and to  maximize patient's level of independence in the home and community environment.     Medical necessity is demonstrated by the following IMPAIRMENTS:  Able to communicate basic wants and needs, but reliant on communication partners to repair and recast to familiar and unfamiliar listeners. Communication and pragmatic deficits impact patient's ability to succeed socially and   Barriers to Therapy: attention  The patient's spiritual, cultural, social, and educational needs were considered and the patient is agreeable to plan of care.   Plan:   Continue Plan of Care for 1 time every other week for 6 months to address his communication and pragmatic deficits.    Thomas Cueto CCC-SLP   6/7/2023

## 2023-07-05 ENCOUNTER — CLINICAL SUPPORT (OUTPATIENT)
Dept: REHABILITATION | Facility: HOSPITAL | Age: 18
End: 2023-07-05
Attending: PEDIATRICS
Payer: MEDICAID

## 2023-07-05 DIAGNOSIS — F84.0 AUTISM: Primary | ICD-10-CM

## 2023-07-05 PROCEDURE — 92507 TX SP LANG VOICE COMM INDIV: CPT | Mod: PN

## 2023-07-05 NOTE — PROGRESS NOTES
[facility]  Pediatric Speech Therapy Treatment Note    Date: 7/5/2023    Patient Name: Clau Hernandez  MRN: [id number]  Therapy Diagnosis:   Encounter Diagnosis   Name Primary?    Autism Yes      Physician: Cali Rivera MD   Physician Orders: Evaluate and treat   Medical Diagnosis: Autism   Age: 18 y.o.    Visit # / Visits Authorized: 2 / 20    Date of Evaluation: 5/24/2023    Plan of Care Expiration Date: 8/24/2023   Authorization Date: 7/21/2022-7/21/2023    Testing last administered: Ongoing    Total visits: 3    Time In: 10:30 AM  Time Out: 11:00 AM  Total Billable Time: 30 minutes     Precautions: Universal    Subjective:   Parent reports: no significant changes   He was compliant to home exercise program.   Response to previous treatment: testing continued with moderate cuing to attend to task.   Caregiver did not attend today's session.  Pain: Clau was unable to rate pain on a numeric scale, but no pain behaviors were noted in today's session.  Objective:   UNTIMED  Procedure Min.   Speech- Language- Voice Therapy    30   Total Untimed Units: 1  Charges Billed/# of units: 1    Short Term Goals: (3 months) Current Progress:   1. Complete formal language testing.  Progressing/ Not Met 7/5/2023  Ongoing     2. Complete formal articulation testing.  Progressing/ Not Met 7/5/2023  Not addressed due to language testing.      3. Complete formal pragmatic testing.    Progressing/ Not Met 7/5/2023  Not addressed due to language testing.      4. Recall speech strategy SOS (Speak slow, overarticulate, and speak up) with 100% accuracy across three consecutive sessions.   Progressing/ Not Met 7/5/2023   Recalled 1 of 3 strategies      5. Utilize speech strategy SOS (Speak slow, overarticulate, and speak up) at the word, phrase, sentence, and conversation level to improve intelligibility with 80% accuracy across three consecutive sessions.   Progressing/ Not Met 7/5/2023   Utilized 0x  despite maximum cuing and modeling.       Long Term Objectives: 3 months  Clau will:  1. Express basic wants and needs independently to familiar and unfamiliar communication partners  2. Demonstrate age-appropriate language, articulation, and pragmatic skills, as based on informal and formal measures  3. Caregivers will demonstrate adequate implementation of HEP and therapeutic strategies to support language development and functional communication.     Patient Education/Response:   SLP and caregiver discussed plan for Clau's targets for therapy. SLP educated caregivers on strategies used in speech therapy to demonstrate carryover of skills into everyday environments. Caregiver did demonstrate understanding of all discussed this date.     Home program established:  Instructed 5/24/23 to follow SOS--speak slow, over-articulate, and speak up to improve intelligibility.  Exercises were reviewed and Clau was able to demonstrate them prior to the end of the session.  Clau demonstrated good  understanding of the education provided.     See EMR under Patient Instructions for exercises provided throughout therapy.  Assessment:   Clau is progressing toward his goals. Continued testing this date with good participation but inconsistent attention. Patient requires moderate cuing to attend to task and becomes off-task easily. Patient is easily redirected when he becomes off task. Re-educated about SOS strategy with minimal success utilizing strategy despite clinician model. See Objectives above for details about progress towards short-term and long-term goals. Current goals remain appropriate. Goals will be added and re-assessed as needed.      Continued testing with Functional Communication Profile and Clinical Evaluation of Language Fundamentals: 5th edition but unable to complete testing due to time constraints and attention.    Patient prognosis is Good. Patient will continue to benefit from skilled outpatient  speech and language therapy to address the deficits listed in the problem list on initial evaluation, provide patient/family education and to maximize patient's level of independence in the home and community environment.     Medical necessity is demonstrated by the following IMPAIRMENTS:  Able to communicate basic wants and needs, but reliant on communication partners to repair and recast to familiar and unfamiliar listeners. Communication and pragmatic deficits impact patient's ability to succeed socially and   Barriers to Therapy: attention  The patient's spiritual, cultural, social, and educational needs were considered and the patient is agreeable to plan of care.   Plan:   Continue Plan of Care for 1 time every other week for 6 months to address his communication and pragmatic deficits.    Thomas Cueto CCC-SLP   7/5/2023

## 2023-07-06 ENCOUNTER — PATIENT MESSAGE (OUTPATIENT)
Dept: PEDIATRICS | Facility: CLINIC | Age: 18
End: 2023-07-06
Payer: MEDICAID

## 2023-07-19 ENCOUNTER — CLINICAL SUPPORT (OUTPATIENT)
Dept: REHABILITATION | Facility: HOSPITAL | Age: 18
End: 2023-07-19
Attending: PEDIATRICS
Payer: MEDICAID

## 2023-07-19 DIAGNOSIS — F84.0 AUTISM: Primary | ICD-10-CM

## 2023-07-19 PROCEDURE — 92507 TX SP LANG VOICE COMM INDIV: CPT | Mod: PN

## 2023-07-19 NOTE — PROGRESS NOTES
[facility]  Pediatric Speech Therapy Treatment Note    Date: 7/19/2023    Patient Name: Clau Hernandez  MRN: [id number]  Therapy Diagnosis:   Encounter Diagnosis   Name Primary?    Autism Yes      Physician: Cali Rivera MD   Physician Orders: Evaluate and treat   Medical Diagnosis: Autism   Age: 18 y.o.    Visit # / Visits Authorized: 3 / 20    Date of Evaluation: 5/24/2023    Plan of Care Expiration Date: 8/24/2023   Authorization Date: 7/21/2022-7/21/2023    Testing last administered: 7/19/2023    Total visits: 4    Time In: 10:30 AM  Time Out: 11:00 AM  Total Billable Time: 30 minutes     Precautions: Universal    Subjective:   Parent reports: no significant changes   He was compliant to home exercise program.   Response to previous treatment: testing continued with moderate cuing to attend to task.   Caregiver did not attend today's session.  Pain: Clau was unable to rate pain on a numeric scale, but no pain behaviors were noted in today's session.  Objective:   UNTIMED  Procedure Min.   Speech- Language- Voice Therapy    30   Total Untimed Units: 1  Charges Billed/# of units: 1    Short Term Goals: (3 months) Current Progress:   1. Complete formal language testing.  Progressing/ Not Met 7/19/2023  Ongoing     2. Complete formal articulation testing.  Progressing/ Not Met 7/19/2023  Not addressed due to language testing.      3. Complete formal pragmatic testing.    Progressing/ Not Met 7/19/2023  Not addressed due to language testing.      4. Recall speech strategy SOS (Speak slow, overarticulate, and speak up) with 100% accuracy across three consecutive sessions.   Progressing/ Not Met 7/19/2023   Recalled 1 of 3 strategies      5. Utilize speech strategy SOS (Speak slow, overarticulate, and speak up) at the word, phrase, sentence, and conversation level to improve intelligibility with 80% accuracy across three consecutive sessions.   Progressing/ Not Met 7/19/2023    Utilized 0x despite maximum cuing and modeling.       Long Term Objectives: 3 months  Clau will:  1. Express basic wants and needs independently to familiar and unfamiliar communication partners  2. Demonstrate age-appropriate language, articulation, and pragmatic skills, as based on informal and formal measures  3. Caregivers will demonstrate adequate implementation of HEP and therapeutic strategies to support language development and functional communication.     Patient Education/Response:   SLP and caregiver discussed plan for Clau's targets for therapy. SLP educated caregivers on strategies used in speech therapy to demonstrate carryover of skills into everyday environments. Caregiver did demonstrate understanding of all discussed this date.     Home program established:  Instructed 5/24/23 to follow SOS--speak slow, over-articulate, and speak up to improve intelligibility.  Exercises were reviewed and Clau was able to demonstrate them prior to the end of the session.  Clau demonstrated good  understanding of the education provided.     See EMR under Patient Instructions for exercises provided throughout therapy.  Assessment:   Clau is progressing toward his goals. Continued testing this date with good participation but inconsistent attention. Patient requires moderate cuing to attend to task and becomes off-task easily. Patient is easily redirected when he becomes off task. Re-educated about SOS strategy with minimal success utilizing strategy despite clinician model. See Objectives above for details about progress towards short-term and long-term goals. Current goals remain appropriate. Goals will be added and re-assessed as needed.      Continued testing with Functional Communication Profile and Clinical Evaluation of Language Fundamentals: 5th edition but unable to complete testing due to time constraints and attention.    The sub-tests Word Classes, Following Directions, Formulate Sentences, and  Semantic Relationships of the Clinical Evaluation of Language Fundamentals-5 (CELF-5) was administered to assess patient's expressive and receptive language skills. Scaled scores ranging between 7 and 13 are considered to be within the average range for subtests and standard scores ranging between 85 and 115 are considered to be within the average range for composite scores. He achieved the following scores:    Subtests administered:    Raw Score Scaled Score   Word Classes  22 2   Following Directions 6 1   Formulated Sentences 9 1   Recalling Sentences 12 1   Understanding Spoken Paragraphs 5 4   Word Definitions 0 1   Sentence Assembly 0 1   Semantic Relationships 3 4     On the Word Classes subtest, Clau achieved a Scaled score of 2.  This score was in the significantly below average range for his age level.    On the Following Directions subtest, Clau achieved a Scaled score of 1 a.  This score was in the significantly below average range for his age level.    On the Formulated Sentences subtest, Clau achieved a Scaled score of 1.  This score was in the significantly below average range for his age level.    On the Recalling Sentences subtest, Clau achieved a Scaled score of 1.  This score was in the significantly below average range for his age level.    On the Understanding Spoken Paragraphs subtest, Clau achieved a Scaled score of 4.  This score was in the significantly below average range for his age level.    On the Word Definitions subtest, Clau achieved a Scaled score of 1.  This score was in the significantly below average range for his age level.    On the Sentence Assembly subtest, Clau achieved a Scaled score of 1.  This score was in the significantly below average range for his age level.    On the Semantic Relationships subtest, Clau achieved a Scaled score of 4 .  This score was in the significantly below average range for his chronological age level.    Summary (Ages 13-20yo)  The  Core Language Score Standard score is derived from the sum of the Scaled scores for the Recalling Sentences, Formulated Sentences, Understanding Spoken Paragraphs and Semantic Relationships subtests.  Clau achieved a Core Standard score of 56 with a ranking at the 0.2 percentile.  This score was in the significantly below average range for his age level.    The Receptive Language Index Standard score was derived from the sum of the Scaled scores for the Word Classes, Understanding Spoken Paragraphs and Semantic Relationships subtests.  Clau achieved a Receptive Language Standard score of 61 with a ranking at the 0.5 percentile.  This score was in the significantly below average range for his age level.    The Expressive Language Index Standard score was derived from the sum of the Scaled scores for the Recalling Sentences, Formulated Sentences and Sentence Assembly subtests.  Clau achieved an Expressive Language Standard score of 45 with a ranking at the <0.1 percentile.  This score was in the significantly below average range for his age level.    The Language Content Index Standard score was derived from the sum of the Scaled scores for the Word Classes, Understanding Spoken Paragraphs and Sentence Assembly subtests.  Clau achieved an Language Content Standard score of 51 with a ranking at the <0.1 percentile.  This score was in the significantly below average range for his age level.     The Language Memory Index Standard score was derived from the sum of the Scaled scores for the Following Directions, Recalling Sentences and Formulated Sentences subtests.  Clau achieved an Language Memory Standard score of 50 with a ranking at the <0.1 percentile.  This score was in the significantly below average range for his age level.     Patient prognosis is Fair. Patient will continue to benefit from skilled outpatient speech and language therapy to address the deficits listed in the problem list on initial  evaluation, provide patient/family education and to maximize patient's level of independence in the home and community environment.     Medical necessity is demonstrated by the following IMPAIRMENTS:  Able to communicate basic wants and needs, but reliant on communication partners to repair and recast to familiar and unfamiliar listeners. Communication and pragmatic deficits impact patient's ability to succeed socially and   Barriers to Therapy: attention  The patient's spiritual, cultural, social, and educational needs were considered and the patient is agreeable to plan of care.   Plan:   Continue Plan of Care for 1 time every other week for 6 months to address his communication and pragmatic deficits.    Thomas Cueto CCC-SLP   7/19/2023

## 2023-08-02 ENCOUNTER — CLINICAL SUPPORT (OUTPATIENT)
Dept: REHABILITATION | Facility: HOSPITAL | Age: 18
End: 2023-08-02
Attending: PEDIATRICS
Payer: MEDICAID

## 2023-08-02 DIAGNOSIS — F80.2 MIXED RECEPTIVE-EXPRESSIVE LANGUAGE DISORDER: Primary | ICD-10-CM

## 2023-08-02 PROCEDURE — 92507 TX SP LANG VOICE COMM INDIV: CPT | Mod: PN

## 2023-08-02 NOTE — PROGRESS NOTES
[facility]  Pediatric Speech Therapy Treatment Note    Date: 8/2/2023    Patient Name: Clau Hernandez  MRN: [id number]  Therapy Diagnosis:   Encounter Diagnosis   Name Primary?    Mixed receptive-expressive language disorder Yes        Physician: Cali Rivera MD   Physician Orders: Evaluate and treat   Medical Diagnosis: Autism   Age: 18 y.o.    Visit # / Visits Authorized: 4 / 20    Date of Evaluation: 5/24/2023    Plan of Care Expiration Date: 8/24/2023   Authorization Date: 7/21/2022-7/21/2023    Testing last administered: 7/19/2023    Total visits: 4    Time In: 10:30 AM  Time Out: 11:00 AM  Total Billable Time: 30 minutes     Precautions: Universal    Subjective:   Parent reports: no significant changes   He was compliant to home exercise program.   Response to previous treatment: session provided by coverage SLP-  formal testing to continue when regular SLP returns next week   Caregiver did not attend today's session.  Pain: Clau was unable to rate pain on a numeric scale, but no pain behaviors were noted in today's session.  Objective:   UNTIMED  Procedure Min.   Speech- Language- Voice Therapy    30   Total Untimed Units: 1  Charges Billed/# of units: 1    Short Term Goals: (3 months) Current Progress:   1. Complete formal language testing.  Progressing/ Not Met 8/2/2023  Ongoing     2. Complete formal articulation testing.  Progressing/ Not Met 8/2/2023  Not addressed due to language testing.      3. Complete formal pragmatic testing.    Progressing/ Not Met 8/2/2023  Not addressed due to language testing.      4. Recall speech strategy SOS (Speak slow, overarticulate, and speak up) with 100% accuracy across three consecutive sessions.   Progressing/ Not Met 8/2/2023   Recalled 1 of 3 strategies    Previous:   Recalled 1 of 3 strategies      5. Utilize speech strategy SOS (Speak slow, overarticulate, and speak up) at the word, phrase, sentence, and conversation level to  "improve intelligibility with 80% accuracy across three consecutive sessions.   Progressing/ Not Met 8/2/2023   Utilized 1x with maximum cueing and modeling.     Previous:  Utilized 0x despite maximum cuing and modeling.       Long Term Objectives: 3 months  Clau will:  1. Express basic wants and needs independently to familiar and unfamiliar communication partners  2. Demonstrate age-appropriate language, articulation, and pragmatic skills, as based on informal and formal measures  3. Caregivers will demonstrate adequate implementation of HEP and therapeutic strategies to support language development and functional communication.     Patient Education/Response:   SLP and caregiver discussed plan for Clau's targets for therapy. SLP educated caregivers on strategies used in speech therapy to demonstrate carryover of skills into everyday environments. Caregiver did demonstrate understanding of all discussed this date.     Home program established:  Instructed 5/24/23 to follow SOS--speak slow, over-articulate, and speak up to improve intelligibility.  Exercises were reviewed and Clau was able to demonstrate them prior to the end of the session.  Clau demonstrated good  understanding of the education provided.     See EMR under Patient Instructions for exercises provided throughout therapy.  Assessment:   Clau is progressing toward his goals. Session provided by coverage SLP-  formal testing to continue when regular SLP returns next week. Patient requires moderate cuing to attend to task and becomes off-task easily. Patient is easily redirected when he becomes off task. Re-educated about SOS strategy with minimal success utilizing strategy despite clinician model. Patient had difficulty identifying miscommunication despite maximal verbal and visual cues for  provided by SLP. Patient educated on behaviors of conversation partner that indicate confusion and to say "no" if partner asks did you say "___" and it is " incorrect. See Objectives above for details about progress towards short-term and long-term goals. Current goals remain appropriate. Goals will be added and re-assessed as needed.      Continued testing with Functional Communication Profile and Clinical Evaluation of Language Fundamentals: 5th edition but unable to complete testing due to time constraints and attention.    The sub-tests Word Classes, Following Directions, Formulate Sentences, and Semantic Relationships of the Clinical Evaluation of Language Fundamentals-5 (CELF-5) was administered to assess patient's expressive and receptive language skills. Scaled scores ranging between 7 and 13 are considered to be within the average range for subtests and standard scores ranging between 85 and 115 are considered to be within the average range for composite scores. He achieved the following scores:    Subtests administered:    Raw Score Scaled Score   Word Classes  22 2   Following Directions 6 1   Formulated Sentences 9 1   Recalling Sentences 12 1   Understanding Spoken Paragraphs 5 4   Word Definitions 0 1   Sentence Assembly 0 1   Semantic Relationships 3 4     On the Word Classes subtest, Clau achieved a Scaled score of 2.  This score was in the significantly below average range for his age level.    On the Following Directions subtest, Clau achieved a Scaled score of 1 a.  This score was in the significantly below average range for his age level.    On the Formulated Sentences subtest, Clau achieved a Scaled score of 1.  This score was in the significantly below average range for his age level.    On the Recalling Sentences subtest, Clau achieved a Scaled score of 1.  This score was in the significantly below average range for his age level.    On the Understanding Spoken Paragraphs subtest, Clau achieved a Scaled score of 4.  This score was in the significantly below average range for his age level.    On the Word Definitions subtest, Calu  achieved a Scaled score of 1.  This score was in the significantly below average range for his age level.    On the Sentence Assembly subtest, Clau achieved a Scaled score of 1.  This score was in the significantly below average range for his age level.    On the Semantic Relationships subtest, Clau achieved a Scaled score of 4 .  This score was in the significantly below average range for his chronological age level.    Summary (Ages 13-22yo)  The Core Language Score Standard score is derived from the sum of the Scaled scores for the Recalling Sentences, Formulated Sentences, Understanding Spoken Paragraphs and Semantic Relationships subtests.  Clau achieved a Core Standard score of 56 with a ranking at the 0.2 percentile.  This score was in the significantly below average range for his age level.    The Receptive Language Index Standard score was derived from the sum of the Scaled scores for the Word Classes, Understanding Spoken Paragraphs and Semantic Relationships subtests.  Clau achieved a Receptive Language Standard score of 61 with a ranking at the 0.5 percentile.  This score was in the significantly below average range for his age level.    The Expressive Language Index Standard score was derived from the sum of the Scaled scores for the Recalling Sentences, Formulated Sentences and Sentence Assembly subtests.  Clau achieved an Expressive Language Standard score of 45 with a ranking at the <0.1 percentile.  This score was in the significantly below average range for his age level.    The Language Content Index Standard score was derived from the sum of the Scaled scores for the Word Classes, Understanding Spoken Paragraphs and Sentence Assembly subtests.  Clau achieved an Language Content Standard score of 51 with a ranking at the <0.1 percentile.  This score was in the significantly below average range for his age level.     The Language Memory Index Standard score was derived from the sum of  the Scaled scores for the Following Directions, Recalling Sentences and Formulated Sentences subtests.  Clau achieved an Language Memory Standard score of 50 with a ranking at the <0.1 percentile.  This score was in the significantly below average range for his age level.     Patient prognosis is Fair. Patient will continue to benefit from skilled outpatient speech and language therapy to address the deficits listed in the problem list on initial evaluation, provide patient/family education and to maximize patient's level of independence in the home and community environment.     Medical necessity is demonstrated by the following IMPAIRMENTS:  Able to communicate basic wants and needs, but reliant on communication partners to repair and recast to familiar and unfamiliar listeners. Communication and pragmatic deficits impact patient's ability to succeed socially  Barriers to Therapy: attention  The patient's spiritual, cultural, social, and educational needs were considered and the patient is agreeable to plan of care.   Plan:   Continue Plan of Care for 1 time every other week for 6 months to address his communication and pragmatic deficits.    Ruth Girard CCC-SLP   8/2/2023

## 2023-08-16 ENCOUNTER — CLINICAL SUPPORT (OUTPATIENT)
Dept: REHABILITATION | Facility: HOSPITAL | Age: 18
End: 2023-08-16
Attending: PEDIATRICS
Payer: MEDICAID

## 2023-08-16 DIAGNOSIS — F84.0 AUTISM: Primary | ICD-10-CM

## 2023-08-16 DIAGNOSIS — F80.2 MIXED RECEPTIVE-EXPRESSIVE LANGUAGE DISORDER: ICD-10-CM

## 2023-08-16 PROCEDURE — 92507 TX SP LANG VOICE COMM INDIV: CPT | Mod: PN

## 2023-08-16 NOTE — PROGRESS NOTES
"[facility]  Pediatric Speech Therapy Treatment Note    Date: 8/16/2023    Patient Name: Clau Hernandez  MRN: [id number]  Therapy Diagnosis:   Encounter Diagnoses   Name Primary?    Autism Yes    Mixed receptive-expressive language disorder         Physician: Cali Rivera MD   Physician Orders: Evaluate and treat   Medical Diagnosis: Autism   Age: 18 y.o.    Visit # / Visits Authorized: 5 / 20    Date of Evaluation: 5/24/2023    Plan of Care Expiration Date: 8/24/2023   Authorization Date: 7/21/2022-7/21/2023    Testing last administered: 7/19/2023    Total visits: 6    Time In: 10:15 AM  Time Out: 11:00 AM  Total Billable Time: 45 minutes     Precautions: Universal    Subjective:   Parent reports: no significant changes   He was compliant to home exercise program.   Response to previous treatment: steady progress.  Caregiver did not attend today's session.  Pain: Clau was unable to rate pain on a numeric scale, but no pain behaviors were noted in today's session.  Objective:   UNTIMED  Procedure Min.   Speech- Language- Voice Therapy    45   Total Untimed Units: 1  Charges Billed/# of units: 1    Short Term Goals: (3 months) Current Progress:   1. Complete formal language testing.  MET 7/19/2023 Met 7/19/2023     2. Complete formal articulation testing.  Progressing/ Not Met 8/16/2023     3. Complete formal pragmatic testing.    Progressing/ Not Met 8/16/2023     4. Recall speech strategy SOS (Speak slow, overarticulate, and speak up) with 100% accuracy across three consecutive sessions.   Progressing/ Not Met 8/16/2023   Recalled 3 of 3 strategies. Reframed it as "robot voice" and patient understood strategy.      5. Utilize speech strategy SOS (Speak slow, overarticulate, and speak up) at the word, phrase, sentence, and conversation level to improve intelligibility with 80% accuracy across three consecutive sessions.   Progressing/ Not Met 8/16/2023   Utilized sporadically " during session when cued and spontaneously.    6. Follow simple 1, 2, and 3 step directions given no cuing with 80% accuracy across three consecutive sessions.   Progressing/ Not Met 8/16/2023  1-step 60% accuracy given cuing  2-step 100% accuracy given cuing and repetitions  3-step 100% accuracy given cuing and repetitions   7. Follow complex 1, 2, and 3 step directions given no cuing with 80% accuracy across three consecutive sessions.   Progressing/ Not Met 8/16/2023  Not addressed this session.    8. Produce 2-, 3-, and 4-syllable words at the word, phrase, sentence, and conversation level with at least 80% intelligibility across three consecutive sessions.   Progressing/ Not Met 8/16/2023  3-syllable words: 90% accuracy given verbal, visual, and tactile cuing  4-syllable words: 90% accuracy given verbal, visual, and tactile cuing   9. Identify and repair communication breakdowns during a 5-minute conversation with a listener for 3 of 5 opportunities across three consecutive sessions.  Progressing/ Not Met 8/16/2023  Not addressed this session.    10. Answer social inferencing questions from at least 2 different perspectives/[id number] given a real or hypothetical problem-solving scenario with at least 80% accuracy cross three consecutive sessions.  Progressing/ Not Met 8/16/2023  Not addressed this session.      Long Term Objectives: 3 months  Clau will:  1. Express basic wants and needs independently to familiar and unfamiliar communication partners  2. Demonstrate age-appropriate language, articulation, and pragmatic skills, as based on informal and formal measures  3. Caregivers will demonstrate adequate implementation of HEP and therapeutic strategies to support language development and functional communication.     Patient Education/Response:   SLP and caregiver discussed plan for Clau's targets for therapy. SLP educated caregivers on strategies used in speech therapy to demonstrate carryover of  skills into everyday environments. Caregiver did demonstrate understanding of all discussed this date.     Home program established:  Instructed 5/24/23 to follow SOS--speak slow, over-articulate, and speak up to improve intelligibility.  Exercises were reviewed and Clau was able to demonstrate them prior to the end of the session.  Clau demonstrated good  understanding of the education provided.     See EMR under Patient Instructions for exercises provided throughout therapy.  Assessment:   Clau is progressing toward his goals. Patient requires moderate cuing to attend to task and becomes off-task easily. Patient is easily redirected when he becomes off task. Re-educated about SOS strategy with increased success utilizing strategy with clinician model and visual cue. New goals added this date to target social and articulation skills to improve communication across the natural environment. See Objectives above for details about progress towards short-term and long-term goals. Current goals remain appropriate. Goals will be added and re-assessed as needed.      Patient prognosis is Fair. Patient will continue to benefit from skilled outpatient speech and language therapy to address the deficits listed in the problem list on initial evaluation, provide patient/family education and to maximize patient's level of independence in the home and community environment.     Medical necessity is demonstrated by the following IMPAIRMENTS:  Able to communicate basic wants and needs, but reliant on communication partners to repair and recast to familiar and unfamiliar listeners. Communication and pragmatic deficits impact patient's ability to succeed socially  Barriers to Therapy: attention  The patient's spiritual, cultural, social, and educational needs were considered and the patient is agreeable to plan of care.   Plan:   Continue Plan of Care for 1 time every other week for 6 months to address his communication and  pragmatic deficits.    Thomas Cueto, CCC-SLP   8/16/2023

## 2023-08-30 ENCOUNTER — CLINICAL SUPPORT (OUTPATIENT)
Dept: REHABILITATION | Facility: HOSPITAL | Age: 18
End: 2023-08-30
Attending: PEDIATRICS
Payer: MEDICAID

## 2023-08-30 DIAGNOSIS — F80.2 MIXED RECEPTIVE-EXPRESSIVE LANGUAGE DISORDER: Primary | ICD-10-CM

## 2023-08-30 PROCEDURE — 92507 TX SP LANG VOICE COMM INDIV: CPT | Mod: PN

## 2023-08-30 NOTE — PLAN OF CARE
[facility]  Speech Therapy Updated Plan of Care- Pediatrics       Date: 8/30/2023   Name: Clau Hernandez  Clinic Number: [id number]    Therapy Diagnosis:   Encounter Diagnosis   Name Primary?    Mixed receptive-expressive language disorder Yes     Physician: Cali Rivera MD    Physician Orders: Evaluate and treat   Medical Diagnosis: Autism     Visit # / Visits Authorized: 6 / 20    Date of Evaluation: 5/24/2023    Insurance Authorization Period: 6/7/2023-8/24/2023  Plan of Care Expiration Date: 8/24/2023  New POC Certification Period: 8/30/2023-2/29/2024    Total Visits Received: 7    Precautions:Standard  Subjective     Update: EDEL came to speech therapy session today accompanied by his mother and siblings.  He transitioned to therapy room independently this date. His family remained in the lobby for the entirety of the session. EDEL participated in 45 minute speech therapy session addressing his overall language and pragmatic skills with caregiver education following session. EDEL was alert, cooperative, and attentive to therapist and therapy tasks with minimal prompting required to stay on task.      Objective     Update: see follow up note dated 8/30/2023    Assessment     Update: Clau Hernandez presents to [facility] status post medical diagnosis of autism. Demonstrates impairments including limitations as described in the problem list. Positive prognostic factors include familial support, participation, and motivation. Negative prognostic factors include none at this time. 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  No barriers to therapy identified.. Patient will benefit from skilled, outpatient rehabilitation speech therapy.    The sub-tests Word Classes, Following Directions, Formulate Sentences, and Semantic Relationships of the Clinical Evaluation of Language Fundamentals-5 (CELF-5) was administered to assess patient's expressive and receptive language skills. Scaled scores ranging between 7 and 13 are considered to be within the average range for subtests and standard scores ranging between 85 and 115 are considered to be within the average range for composite scores. He achieved the following scores:     Subtests administered:     Raw Score Scaled Score   Word Classes  22 2   Following Directions 6 1   Formulated Sentences 9 1   Recalling Sentences 12 1   Understanding Spoken Paragraphs 5 4   Word Definitions 0 1   Sentence Assembly 0 1   Semantic Relationships 3 4      On the Word Classes subtest, Clau achieved a Scaled score of 2.  This score was in the significantly below average range for his age level.     On the Following Directions subtest, Clau achieved a Scaled score of 1 a.  This score was in the significantly below average range for his age level.     On the Formulated Sentences subtest, Clau achieved a Scaled score of 1.  This score was in the significantly below average range for his age level.     On the Recalling Sentences subtest, Clau achieved a Scaled score of 1.  This score was in the significantly below average range for his age level.     On the Understanding Spoken Paragraphs subtest, Clau achieved a Scaled score of 4.  This score was in the significantly below average range for his age level.     On the Word Definitions subtest, Clau achieved a Scaled score of 1.  This score was in the significantly below average range for his age level.     On the Sentence Assembly subtest, Clau achieved a Scaled score of 1.  This score was in the significantly below average  range for his age level.     On the Semantic Relationships subtest, Clau achieved a Scaled score of 4 .  This score was in the significantly below average range for his chronological age level.     Summary (Ages 13-22yo)  The Core Language Score Standard score is derived from the sum of the Scaled scores for the Recalling Sentences, Formulated Sentences, Understanding Spoken Paragraphs and Semantic Relationships subtests.  Clau achieved a Core Standard score of 56 with a ranking at the 0.2 percentile.  This score was in the significantly below average range for his age level.     The Receptive Language Index Standard score was derived from the sum of the Scaled scores for the Word Classes, Understanding Spoken Paragraphs and Semantic Relationships subtests.  Clau achieved a Receptive Language Standard score of 61 with a ranking at the 0.5 percentile.  This score was in the significantly below average range for his age level.     The Expressive Language Index Standard score was derived from the sum of the Scaled scores for the Recalling Sentences, Formulated Sentences and Sentence Assembly subtests.  Clau achieved an Expressive Language Standard score of 45 with a ranking at the <0.1 percentile.  This score was in the significantly below average range for his age level.     The Language Content Index Standard score was derived from the sum of the Scaled scores for the Word Classes, Understanding Spoken Paragraphs and Sentence Assembly subtests.  Clau achieved an Language Content Standard score of 51 with a ranking at the <0.1 percentile.  This score was in the significantly below average range for his age level.      The Language Memory Index Standard score was derived from the sum of the Scaled scores for the Following Directions, Recalling Sentences and Formulated Sentences subtests.  Clau achieved an Language Memory Standard score of 50 with a ranking at the <0.1 percentile.  This score was in the  significantly below average range for his age level.     Rehab Potential: good   Pt's spiritual, cultural, and educational needs considered and patient agreeable to plan of care and goals.    Education: Plan of Care     Previous Short Term Goals Status: 3 months  1. Complete formal language testing.  MET 7/19/2023 Met 7/19/2023      2. Complete formal articulation testing.  Progressing/ Not Met 8/30/2023  Not addressed this session.    3. Complete formal pragmatic testing.    Progressing/ Not Met 8/30/2023  Not addressed this session.    4. Recall speech strategy SOS (Speak slow, overarticulate, and speak up) with 100% accuracy across three consecutive sessions.   Progressing/ Not Met 8/30/2023   Recalled 2 of 3 strategies. Reviewed and discussed.      5. Utilize speech strategy SOS (Speak slow, overarticulate, and speak up) at the word, phrase, sentence, and conversation level to improve intelligibility with 80% accuracy across three consecutive sessions.   Progressing/ Not Met 8/30/2023   Utilized inconsistently during session when cued and spontaneously. Usage of strategy increased with cuing      New Short Term Goals: 3 months  6. Follow simple 1, 2, and 3 step directions given no cuing with 80% accuracy across three consecutive sessions.   7. Follow complex 1, 2, and 3 step directions given no cuing with 80% accuracy across three consecutive sessions.   8. Produce 2-, 3-, and 4-syllable words at the word, phrase, sentence, and conversation level with at least 80% intelligibility across three consecutive sessions.   9. Identify and repair communication breakdowns during a 5-minute conversation with a listener for 3 of 5 opportunities across three consecutive sessions.   10. Answer social inferencing questions from at least 2 different perspectives/[id number]-[id number]-[id number] given a real or hypothetical problem-solving scenario with at least 80% accuracy cross three consecutive sessions.      Long Term Goal Status:  6  months  1. Express basic wants and needs independently to familiar and unfamiliar communication partners  2. Demonstrate age-appropriate language, articulation, and pragmatic skills, as based on informal and formal measures  3. Caregivers will demonstrate adequate implementation of HEP and therapeutic strategies to support language development and functional communication.     Goals Previously Met:  None at this time.     Reasons for Recertification of Therapy: JJ has demonstrated consistent progress toward outcomes throughout the course of treatment. Goals, however, have not yet been met due to increased level of skill required as child ages.         Plan     Updated Certification Period: 8/30/2023 to 2/29/2024    Recommended Treatment Plan: Patient will participate in the [facility] rehabilitation program for speech therapy 1 time every other week to address his Communication and Pragmatic  deficits, to educate patient and their family, and to participate in a home exercise program.     Other recommendations: None at this time.     Therapist's Name:  Thomas Cueto CCC-SLP   8/30/2023      I CERTIFY THE NEED FOR THESE SERVICES FURNISHED UNDER THIS PLAN OF TREATMENT AND WHILE UNDER MY CARE      Physician Name: _______________________________    Physician Signature: ____________________________

## 2023-08-30 NOTE — PROGRESS NOTES
[facility]  Pediatric Speech Therapy Treatment Note    Date: 8/30/2023    Patient Name: Clau Hernandez  MRN: [id number]  Therapy Diagnosis:   Encounter Diagnosis   Name Primary?    Mixed receptive-expressive language disorder Yes      Physician: Cali Rivera MD   Physician Orders: Evaluate and treat   Medical Diagnosis: Autism   Age: 18 y.o.    Visit # / Visits Authorized: 6 / 20    Date of Evaluation: 5/24/2023    Plan of Care Expiration Date: 8/24/2023   Authorization Date: 7/21/2022-7/21/2023    Testing last administered: 7/19/2023    Total visits: 7    Time In: 10:15 AM  Time Out: 11:00 AM  Total Billable Time: 45 minutes     Precautions: Universal    Subjective:   Parent reports: no significant changes, when cuing to slow down and use speech strategies parent reported frustration from patient.   He was compliant to home exercise program.   Response to previous treatment: steady progress.  Caregiver did not attend today's session.  Pain: Clau was unable to rate pain on a numeric scale, but no pain behaviors were noted in today's session.  Objective:   UNTIMED  Procedure Min.   Speech- Language- Voice Therapy    45   Total Untimed Units: 1  Charges Billed/# of units: 1    Short Term Goals: (3 months) Current Progress:   1. Complete formal language testing.  MET 7/19/2023 Met 7/19/2023     2. Complete formal articulation testing.  Progressing/ Not Met 8/30/2023  Not addressed this session.    3. Complete formal pragmatic testing.    Progressing/ Not Met 8/30/2023  Not addressed this session.    4. Recall speech strategy SOS (Speak slow, overarticulate, and speak up) with 100% accuracy across three consecutive sessions.   Progressing/ Not Met 8/30/2023   Recalled 2 of 3 strategies. Reviewed and discussed.      5. Utilize speech strategy SOS (Speak slow, overarticulate, and speak up) at the word, phrase, sentence, and conversation level to improve intelligibility with 80%  accuracy across three consecutive sessions.   Progressing/ Not Met 8/30/2023   Utilized inconsistently during session when cued and spontaneously. Usage of strategy increased with cuing.   6. Follow simple 1, 2, and 3 step directions given no cuing with 80% accuracy across three consecutive sessions.   Progressing/ Not Met 8/30/2023  Not addressed this session.     Previous: 1-step 60% accuracy given cuing  2-step 100% accuracy given cuing and repetitions  3-step 100% accuracy given cuing and repetitions   7. Follow complex 1, 2, and 3 step directions given no cuing with 80% accuracy across three consecutive sessions.   Progressing/ Not Met 8/30/2023  Not addressed this session.    8. Produce 2-, 3-, and 4-syllable words at the word, phrase, sentence, and conversation level with at least 80% intelligibility across three consecutive sessions.   Progressing/ Not Met 8/30/2023  3-syllable words: 80% accuracy (2/3)  4-syllable words: 90% accuracy (2/3)   9. Identify and repair communication breakdowns during a 5-minute conversation with a listener for 3 of 5 opportunities across three consecutive sessions.  Progressing/ Not Met 8/30/2023  Not addressed this session.    10. Answer social inferencing questions from at least 2 different perspectives/[id number]-[id number]-awkz given a real or hypothetical problem-solving scenario with at least 80% accuracy cross three consecutive sessions.  Progressing/ Not Met 8/30/2023  5 of 5 trials (1/3)     Long Term Objectives: 3 months  Sachahima will:  1. Express basic wants and needs independently to familiar and unfamiliar communication partners  2. Demonstrate age-appropriate language, articulation, and pragmatic skills, as based on informal and formal measures  3. Caregivers will demonstrate adequate implementation of HEP and therapeutic strategies to support language development and functional communication.     Patient Education/Response:   SLP and caregiver discussed plan for Clau's  targets for therapy. SLP educated caregivers on strategies used in speech therapy to demonstrate carryover of skills into everyday environments. Caregiver did demonstrate understanding of all discussed this date.     Home program established:  Instructed 5/24/23 to follow SOS--speak slow, over-articulate, and speak up to improve intelligibility.  Provided 7 Strategies for Appropriate Rate of Speech, see 8/30/2023.   Exercises were reviewed and Clau was able to demonstrate them prior to the end of the session.  Clau demonstrated good  understanding of the education provided.     See EMR under Patient Instructions for exercises provided throughout therapy.  Assessment:   Clau is progressing toward his goals. Patient requires minimal cuing to attend to task and becomes off-task easily. Patient is easily redirected when he becomes off task. Re-educated about SOS strategy with increased success utilizing strategy with clinician model and visual cue. Targeted new goals added this date to target social and articulation skills to improve communication across the natural environment. See Objectives above for details about progress towards short-term and long-term goals. Current goals remain appropriate. Goals will be added and re-assessed as needed.      Patient prognosis is Fair. Patient will continue to benefit from skilled outpatient speech and language therapy to address the deficits listed in the problem list on initial evaluation, provide patient/family education and to maximize patient's level of independence in the home and community environment.     Medical necessity is demonstrated by the following IMPAIRMENTS:  Able to communicate basic wants and needs, but reliant on communication partners to repair and recast to familiar and unfamiliar listeners. Communication and pragmatic deficits impact patient's ability to succeed socially  Barriers to Therapy: attention  The patient's spiritual, cultural, social, and  educational needs were considered and the patient is agreeable to plan of care.   Plan:   Continue Plan of Care for 1 time every other week for 6 months to address his communication and pragmatic deficits.    Thomas Cueto CCC-SLP   8/30/2023

## 2023-09-13 ENCOUNTER — CLINICAL SUPPORT (OUTPATIENT)
Dept: REHABILITATION | Facility: HOSPITAL | Age: 18
End: 2023-09-13
Payer: MEDICAID

## 2023-09-13 DIAGNOSIS — F80.2 MIXED RECEPTIVE-EXPRESSIVE LANGUAGE DISORDER: Primary | ICD-10-CM

## 2023-09-13 PROCEDURE — 92507 TX SP LANG VOICE COMM INDIV: CPT | Mod: PN

## 2023-09-13 NOTE — PROGRESS NOTES
[facility]  Pediatric Speech Therapy Treatment Note    Date: 9/13/2023    Patient Name: Clau Hernandez  MRN: [id number]  Therapy Diagnosis:   Encounter Diagnosis   Name Primary?    Mixed receptive-expressive language disorder Yes      Physician: No ref. provider found   Physician Orders: Evaluate and treat   Medical Diagnosis: Autism   Age: 18 y.o.    Visit # / Visits Authorized: 67 / 20    Date of Evaluation: 5/24/2023    Plan of Care Expiration Date: 8/24/2023   New POC Certification Period: 8/30/2023-2/29/2024  Authorization Date: 7/21/2022-7/21/2023    Testing last administered: 7/19/2023    Total visits: 8    Time In: 10:15 AM  Time Out: 11:00 AM  Total Billable Time: 45 minutes     Precautions: Universal    Subjective:   Parent reports: no significant changes, when cuing to slow down and use speech strategies parent reported frustration from patient.   He was compliant to home exercise program.   Response to previous treatment: steady progress.  Caregiver did not attend today's session.  Pain: Clau was unable to rate pain on a numeric scale, but no pain behaviors were noted in today's session.  Objective:   UNTIMED  Procedure Min.   Speech- Language- Voice Therapy    45   Total Untimed Units: 1  Charges Billed/# of units: 1    Short Term Goals: (3 months) Current Progress:   1. Complete formal language testing.  MET 7/19/2023 Met 7/19/2023     2. Complete formal articulation testing.  Progressing/ Not Met 9/13/2023  Not addressed this session.    3. Complete formal pragmatic testing.    Progressing/ Not Met 9/13/2023  Not addressed this session.    4. Recall speech strategy SOS (Speak slow, overarticulate, and speak up) with 100% accuracy across three consecutive sessions.   Progressing/ Not Met 9/13/2023   Recalled 3 of 3 strategies given moderate verbal cuing. Reviewed and discussed.      5. Utilize speech strategy SOS (Speak slow, overarticulate, and speak up) at the word,  phrase, sentence, and conversation level to improve intelligibility with 80% accuracy across three consecutive sessions.   Progressing/ Not Met 9/13/2023   Utilized inconsistently during session when cued and spontaneously. Usage of strategy increased with cuing. Recalled 3 of 3 strategies given moderate verbal cuing   6. Follow simple 1, 2, and 3 step directions given no cuing with 80% accuracy across three consecutive sessions.   Progressing/ Not Met 9/13/2023  Not addressed this session.     Previous: 1-step 60% accuracy given cuing  2-step 100% accuracy given cuing and repetitions  3-step 100% accuracy given cuing and repetitions   7. Follow complex 1, 2, and 3 step directions given no cuing with 80% accuracy across three consecutive sessions.   Progressing/ Not Met 9/13/2023  Not addressed this session.    8. Produce 2-, 3-, and 4-syllable words at the word, phrase, sentence, and conversation level with at least 80% intelligibility across three consecutive sessions.   Progressing/ Not Met 9/13/2023  Not addressed this session.     Previous: 3-syllable words: 80% accuracy (2/3)  4-syllable words: 90% accuracy (2/3)   9. Identify and repair communication breakdowns during a 5-minute conversation with a listener for 3 of 5 opportunities across three consecutive sessions.  Progressing/ Not Met 9/13/2023  Review communication breakdown repair strategies.    10. Answer social inferencing questions from at least 2 different perspectives/[id number] given a real or hypothetical problem-solving scenario with at least 80% accuracy cross three consecutive sessions.  Progressing/ Not Met 9/13/2023  3 of 5 trials (decrease)     Long Term Objectives: 3 months  Javian will:  1. Express basic wants and needs independently to familiar and unfamiliar communication partners  2. Demonstrate age-appropriate language, articulation, and pragmatic skills, as based on informal and formal measures  3. Caregivers will demonstrate  adequate implementation of HEP and therapeutic strategies to support language development and functional communication.     Patient Education/Response:   SLP and caregiver discussed plan for Clau's targets for therapy. SLP educated caregivers on strategies used in speech therapy to demonstrate carryover of skills into everyday environments. Caregiver did demonstrate understanding of all discussed this date.     Home program established:  Instructed 5/24/23 to follow SOS--speak slow, over-articulate, and speak up to improve intelligibility.  Provided 7 Strategies for Appropriate Rate of Speech, see 8/30/2023.   Exercises were reviewed and Clau was able to demonstrate them prior to the end of the session.  Clau demonstrated good  understanding of the education provided.     See EMR under Patient Instructions for exercises provided throughout therapy.  Assessment:   Clau is progressing toward his goals. Patient requires minimal cuing to attend to task and becomes off-task easily. Patient is easily redirected when he becomes off task. Re-educated about SOS strategy with increased success utilizing strategy with clinician model and visual cue. Targeted new goals added this date to target social and articulation skills to improve communication across the natural environment. See Objectives above for details about progress towards short-term and long-term goals. Current goals remain appropriate. Goals will be added and re-assessed as needed.      Patient prognosis is Fair. Patient will continue to benefit from skilled outpatient speech and language therapy to address the deficits listed in the problem list on initial evaluation, provide patient/family education and to maximize patient's level of independence in the home and community environment.     Medical necessity is demonstrated by the following IMPAIRMENTS:  Able to communicate basic wants and needs, but reliant on communication partners to repair and recast  to familiar and unfamiliar listeners. Communication and pragmatic deficits impact patient's ability to succeed socially  Barriers to Therapy: attention  The patient's spiritual, cultural, social, and educational needs were considered and the patient is agreeable to plan of care.   Plan:   Continue Plan of Care for 1 time every other week for 6 months to address his communication deficits on an outpatient basis with direct intervention, parent education and home programming to facilitate carry-over of learned therapy targets in therapy sessions to the home and daily environment. Therapy will be discontinued when child has met all goals, is not making progress, parent discontinues therapy, and/or for any other applicable reasons.      Thomas Cueto CCC-SLP   9/13/2023

## 2023-09-27 ENCOUNTER — CLINICAL SUPPORT (OUTPATIENT)
Dept: REHABILITATION | Facility: HOSPITAL | Age: 18
End: 2023-09-27
Payer: MEDICAID

## 2023-09-27 DIAGNOSIS — F80.2 MIXED RECEPTIVE-EXPRESSIVE LANGUAGE DISORDER: Primary | ICD-10-CM

## 2023-09-27 PROCEDURE — 92507 TX SP LANG VOICE COMM INDIV: CPT | Mod: PO

## 2023-09-27 NOTE — PROGRESS NOTES
[facility]  Pediatric Speech Therapy Treatment Note    Date: 9/27/2023    Patient Name: Clau Hernandez  MRN: [id number]  Therapy Diagnosis:   Encounter Diagnosis   Name Primary?    Mixed receptive-expressive language disorder Yes      Physician: Cali Rivera MD  Physician Orders: Evaluate and treat   Medical Diagnosis: Autism   Age: 18 y.o.    Visit # / Visits Authorized: Pending authorization.    Date of Evaluation: 5/24/2023    Plan of Care Expiration Date: 8/24/2023   New POC Certification Period: 8/30/2023-2/29/2024  Authorization Date: 7/21/2022-7/21/2023    Testing last administered: 7/19/2023    Total visits: 9    Time In: 10:15 AM  Time Out: 11:00 AM  Total Billable Time: 45 minutes     Precautions: Universal    Subjective:   Parent reports: no significant changes, when cuing to slow down and use speech strategies parent reported frustration from patient.   He was compliant to home exercise program.   Response to previous treatment: steady progress.  Caregiver did not attend today's session.  Pain: Clau was unable to rate pain on a numeric scale, but no pain behaviors were noted in today's session.  Objective:   UNTIMED  Procedure Min.   Speech- Language- Voice Therapy    45   Total Untimed Units: 1  Charges Billed/# of units: 1    Short Term Goals: (3 months) Current Progress:   1. Complete formal language testing.  MET 7/19/2023 Met 7/19/2023     2. Complete formal articulation testing.  Progressing/ Not Met 9/27/2023  Not addressed this session.    3. Complete formal pragmatic testing.    Progressing/ Not Met 9/27/2023  Not addressed this session.    4. Recall speech strategy SOS (Speak slow, overarticulate, and speak up) with 100% accuracy across three consecutive sessions.   Progressing/ Not Met 9/27/2023   Recalled 3 of 3 strategies given moderate verbal cuing. Reviewed and discussed. (2/3)      5. Utilize speech strategy SOS (Speak slow, overarticulate, and speak up)  at the word, phrase, sentence, and conversation level to improve intelligibility with 80% accuracy across three consecutive sessions.   Progressing/ Not Met 9/27/2023   Utilized inconsistently during session when cued and spontaneously. Usage of strategy increased with cuing.Decreased cuing to utilize strategy this date.   6. Follow simple 1, 2, and 3 step directions given no cuing with 80% accuracy across three consecutive sessions.   Progressing/ Not Met 9/27/2023  Not addressed this session.     Previous: 1-step 60% accuracy given cuing  2-step 100% accuracy given cuing and repetitions  3-step 100% accuracy given cuing and repetitions   7. Follow complex 1, 2, and 3 step directions given no cuing with 80% accuracy across three consecutive sessions.   Progressing/ Not Met 9/27/2023  Not addressed this session.    8. Produce 2-, 3-, and 4-syllable words at the word, phrase, sentence, and conversation level with at least 80% intelligibility across three consecutive sessions.   Progressing/ Not Met 9/27/2023  Not addressed this session.     Previous: 3-syllable words: 80% accuracy (2/3)  4-syllable words: 90% accuracy (2/3)   9. Identify and repair communication breakdowns during a 5-minute conversation with a listener for 3 of 5 opportunities across three consecutive sessions.  Progressing/ Not Met 9/27/2023  Review communication breakdown repair strategies. Identified communication breakdown with 60% accuracy given visual media prompt   10. Answer social inferencing questions from at least 2 different perspectives/[id number] given a real or hypothetical problem-solving scenario with at least 80% accuracy cross three consecutive sessions.  Progressing/ Not Met 9/27/2023  90% accuracy given minimal cuing (increase, 1/3)     Long Term Objectives: 3 months  Javian will:  1. Express basic wants and needs independently to familiar and unfamiliar communication partners  2. Demonstrate age-appropriate language,  articulation, and pragmatic skills, as based on informal and formal measures  3. Caregivers will demonstrate adequate implementation of HEP and therapeutic strategies to support language development and functional communication.     Patient Education/Response:   SLP and caregiver discussed plan for Clau's targets for therapy. SLP educated caregivers on strategies used in speech therapy to demonstrate carryover of skills into everyday environments. Caregiver did demonstrate understanding of all discussed this date.     Home program established:  Instructed 5/24/23 to follow SOS--speak slow, over-articulate, and speak up to improve intelligibility.  Provided 7 Strategies for Appropriate Rate of Speech, see 8/30/2023.   Exercises were reviewed and Clau was able to demonstrate them prior to the end of the session.  Clau demonstrated good  understanding of the education provided.     See EMR under Patient Instructions for exercises provided throughout therapy.  Assessment:   Clau is progressing toward his goals. Patient requires minimal cuing to attend to task and becomes off-task easily. Patient is easily redirected when he becomes off task. Re-educated about SOS strategy with increased success utilizing strategy with clinician model and visual cue. Targeted new goals added this date to target social and articulation skills to improve communication across the natural environment. Provided Youtube clips, patient identified instances of communication breakdown with moderate success. Patient required cuing to identify communication repair, but increased success in navigating communication repair strategies. See Objectives above for details about progress towards short-term and long-term goals. Current goals remain appropriate. Goals will be added and re-assessed as needed.      Patient prognosis is Fair. Patient will continue to benefit from skilled outpatient speech and language therapy to address the deficits  listed in the problem list on initial evaluation, provide patient/family education and to maximize patient's level of independence in the home and community environment.     Medical necessity is demonstrated by the following IMPAIRMENTS:  Able to communicate basic wants and needs, but reliant on communication partners to repair and recast to familiar and unfamiliar listeners. Communication and pragmatic deficits impact patient's ability to succeed socially  Barriers to Therapy: attention  The patient's spiritual, cultural, social, and educational needs were considered and the patient is agreeable to plan of care.   Plan:   Continue Plan of Care for 1 time every other week for 6 months to address his communication deficits on an outpatient basis with direct intervention, parent education and home programming to facilitate carry-over of learned therapy targets in therapy sessions to the home and daily environment. Therapy will be discontinued when child has met all goals, is not making progress, parent discontinues therapy, and/or for any other applicable reasons.      Thomas Cueto CCC-SLP   9/27/2023

## 2023-10-11 ENCOUNTER — CLINICAL SUPPORT (OUTPATIENT)
Dept: REHABILITATION | Facility: HOSPITAL | Age: 18
End: 2023-10-11
Payer: MEDICAID

## 2023-10-11 DIAGNOSIS — F80.2 MIXED RECEPTIVE-EXPRESSIVE LANGUAGE DISORDER: Primary | ICD-10-CM

## 2023-10-11 PROCEDURE — 92507 TX SP LANG VOICE COMM INDIV: CPT | Mod: PO

## 2023-10-11 NOTE — PROGRESS NOTES
[facility]  Pediatric Speech Therapy Treatment Note    Date: 10/11/2023    Patient Name: Clau Hernandez  MRN: [id number]  Therapy Diagnosis:   Encounter Diagnosis   Name Primary?    Mixed receptive-expressive language disorder Yes      Physician: Cali Rivera MD  Physician Orders: Evaluate and treat   Medical Diagnosis: Autism   Age: 18 y.o.    Visit # / Visits Authorized: Pending authorization.    Date of Evaluation: 5/24/2023    Plan of Care Expiration Date: 8/24/2023   New POC Certification Period: 8/30/2023-2/29/2024  Authorization Date: 7/21/2022-7/21/2023    Testing last administered: 7/19/2023    Total visits: 10    Time In: 10:15 AM  Time Out: 11:00 AM  Total Billable Time: 45 minutes     Precautions: Universal    Subjective:   Parent reports: no significant changes.  He was compliant to home exercise program.   Response to previous treatment: steady progress.  Caregiver did not attend today's session.  Pain: Clau was unable to rate pain on a numeric scale, but no pain behaviors were noted in today's session.  Objective:   UNTIMED  Procedure Min.   Speech- Language- Voice Therapy    45   Total Untimed Units: 1  Charges Billed/# of units: 1    Short Term Goals: (3 months) Current Progress:   1. Complete formal language testing.  MET 7/19/2023 Met 7/19/2023     2. Complete formal articulation testing.  Progressing/ Not Met 10/11/2023  Not addressed this session.    3. Complete formal pragmatic testing.    Progressing/ Not Met 10/11/2023  Not addressed this session.    4. Recall speech strategy SOS (Speak slow, overarticulate, and speak up) with 100% accuracy across three consecutive sessions.   Goal met 10/11/2023 Recalled 3 of 3 strategies given moderate verbal cuing. Reviewed and discussed. (3/3)      5. Utilize speech strategy SOS (Speak slow, overarticulate, and speak up) at the word, phrase, sentence, and conversation level to improve intelligibility with 80% accuracy  across three consecutive sessions.   Progressing/ Not Met 10/11/2023   Utilized inconsistently during session when cued and spontaneously. Usage of strategy increased with cuing.    6. Follow simple 1, 2, and 3 step directions given no cuing with 80% accuracy across three consecutive sessions.   Progressing/ Not Met 10/11/2023  Not addressed this session.     Previous: 1-step 60% accuracy given cuing  2-step 100% accuracy given cuing and repetitions  3-step 100% accuracy given cuing and repetitions   7. Follow complex 1, 2, and 3 step directions given no cuing with 80% accuracy across three consecutive sessions.   Progressing/ Not Met 10/11/2023  Not addressed this session.    8. Produce 2-, 3-, and 4-syllable words at the word, phrase, sentence, and conversation level with at least 80% intelligibility across three consecutive sessions.   Progressing/ Not Met 10/11/2023  Not addressed this session.     Previous: 3-syllable words: 80% accuracy (2/3)  4-syllable words: 90% accuracy (2/3)   9. Identify and repair communication breakdowns during a 5-minute conversation with a listener for 3 of 5 opportunities across three consecutive sessions.  Progressing/ Not Met 10/11/2023  Review communication breakdown repair strategies. Identified communication breakdown with 90% accuracy given verbal or visual prompt (1/3)   10. Answer social inferencing questions from at least 2 different perspectives/[id number] given a real or hypothetical problem-solving scenario with at least 80% accuracy cross three consecutive sessions.  Progressing/ Not Met 10/11/2023  80% accuracy given minimal cuing (decrease, 2/3)     Long Term Objectives: 3 months  Javian will:  1. Express basic wants and needs independently to familiar and unfamiliar communication partners  2. Demonstrate age-appropriate language, articulation, and pragmatic skills, as based on informal and formal measures  3. Caregivers will demonstrate adequate implementation of  HEP and therapeutic strategies to support language development and functional communication.     Patient Education/Response:   SLP and caregiver discussed plan for Clau's targets for therapy. SLP educated caregivers on strategies used in speech therapy to demonstrate carryover of skills into everyday environments. Caregiver did demonstrate understanding of all discussed this date.     Home program established:  Instructed 5/24/23 to follow SOS--speak slow, over-articulate, and speak up to improve intelligibility.  Provided 7 Strategies for Appropriate Rate of Speech, see 8/30/2023. See Instructions dated 10/11/2023 for Communication Repair Strategies for Speakers and Listeners.  Exercises were reviewed and Clau was able to demonstrate them prior to the end of the session.  Clau demonstrated good  understanding of the education provided.     See EMR under Patient Instructions for exercises provided throughout therapy.  Assessment:   Clau is progressing toward his goals. Patient requires minimal cuing to attend to task and becomes off-task easily. Patient is easily redirected when he becomes off task. Re-educated about SOS strategy with increased success utilizing strategy with clinician model and visual cue. Targeted new goals added this date to target social and articulation skills to improve communication across the natural environment. Provided verbal scenarios and visual prompts, patient identified instances of communication breakdown with increased success. Patient required decreased cuing to identify communication repair, but increased success in navigating communication repair strategies. See Objectives above for details about progress towards short-term and long-term goals. Current goals remain appropriate. Goals will be added and re-assessed as needed.      Patient prognosis is Fair. Patient will continue to benefit from skilled outpatient speech and language therapy to address the deficits listed in  the problem list on initial evaluation, provide patient/family education and to maximize patient's level of independence in the home and community environment.     Medical necessity is demonstrated by the following IMPAIRMENTS:  Able to communicate basic wants and needs, but reliant on communication partners to repair and recast to familiar and unfamiliar listeners. Communication and pragmatic deficits impact patient's ability to succeed socially  Barriers to Therapy: attention  The patient's spiritual, cultural, social, and educational needs were considered and the patient is agreeable to plan of care.   Plan:   Continue Plan of Care for 1 time every other week for 6 months to address his communication deficits on an outpatient basis with direct intervention, parent education and home programming to facilitate carry-over of learned therapy targets in therapy sessions to the home and daily environment. Therapy will be discontinued when child has met all goals, is not making progress, parent discontinues therapy, and/or for any other applicable reasons.    Thomas Cueto CCC-SLP   10/11/2023

## 2023-10-25 ENCOUNTER — CLINICAL SUPPORT (OUTPATIENT)
Dept: REHABILITATION | Facility: HOSPITAL | Age: 18
End: 2023-10-25
Payer: MEDICAID

## 2023-10-25 DIAGNOSIS — F80.2 MIXED RECEPTIVE-EXPRESSIVE LANGUAGE DISORDER: Primary | ICD-10-CM

## 2023-10-25 PROCEDURE — 92507 TX SP LANG VOICE COMM INDIV: CPT | Mod: PO

## 2023-10-25 NOTE — PROGRESS NOTES
[facility]  Pediatric Speech Therapy Treatment Note    Date: 10/25/2023    Patient Name: Clau Hernandez  MRN: [id number]  Therapy Diagnosis:   Encounter Diagnosis   Name Primary?    Mixed receptive-expressive language disorder Yes      Physician: Cali Rivera MD  Physician Orders: Evaluate and treat   Medical Diagnosis: Autism   Age: 18 y.o.    Visit # / Visits Authorized: Pending authorization.    Date of Evaluation: 5/24/2023    Plan of Care Expiration Date: 8/24/2023   New POC Certification Period: 8/30/2023-2/29/2024  Authorization Date: 7/21/2022-7/21/2023    Testing last administered: 7/19/2023    Total visits: 10    Time In: 10:15 AM  Time Out: 11:00 AM  Total Billable Time: 45 minutes     Precautions: Universal    Subjective:   Parent reports: no significant changes.  He was compliant to home exercise program.   Response to previous treatment: steady progress.  Caregiver did not attend today's session.  Pain: Clau was unable to rate pain on a numeric scale, but no pain behaviors were noted in today's session.  Objective:   UNTIMED  Procedure Min.   Speech- Language- Voice Therapy    45   Total Untimed Units: 1  Charges Billed/# of units: 1    Short Term Goals: (3 months) Current Progress:   1. Complete formal language testing.  MET 7/19/2023 Met 7/19/2023     2. Complete formal articulation testing.  Progressing/ Not Met 10/25/2023  Not addressed this session.    3. Complete formal pragmatic testing.    Progressing/ Not Met 10/25/2023  Not addressed this session.    4. Recall speech strategy SOS (Speak slow, overarticulate, and speak up) with 100% accuracy across three consecutive sessions.   Goal met 10/11/2023 Recalled 3 of 3 strategies given moderate verbal cuing. Reviewed and discussed. (3/3)      5. Utilize speech strategy SOS (Speak slow, overarticulate, and speak up) at the word, phrase, sentence, and conversation level to improve intelligibility with 80% accuracy  across three consecutive sessions.   Progressing/ Not Met 10/25/2023   Utilized inconsistently during session when cued and spontaneously. Usage of strategy increased with cuing.    6. Follow simple 1, 2, and 3 step directions given no cuing with 80% accuracy across three consecutive sessions.   Progressing/ Not Met 10/25/2023  Not addressed this session.     Previous: 1-step 60% accuracy given cuing  2-step 100% accuracy given cuing and repetitions  3-step 100% accuracy given cuing and repetitions   7. Follow complex 1, 2, and 3 step directions given no cuing with 80% accuracy across three consecutive sessions.   Progressing/ Not Met 10/25/2023  Not addressed this session.    8. Produce 2-, 3-, and 4-syllable words at the word, phrase, sentence, and conversation level with at least 80% intelligibility across three consecutive sessions.   Progressing/ Not Met 10/25/2023  Not addressed this session.     Previous: 3-syllable words: 80% accuracy (2/3)  4-syllable words: 90% accuracy (2/3)   9. Identify and repair communication breakdowns during a 5-minute conversation with a listener for 3 of 5 opportunities across three consecutive sessions.  Progressing/ Not Met 10/25/2023  Review communication breakdown repair strategies. Identified communication breakdown with 90% accuracy given verbal or visual prompt (2/3).  Repaired communication breakdown with 60% accuracy    10. Answer social inferencing questions from at least 2 different perspectives/[id number] given a real or hypothetical problem-solving scenario with at least 80% accuracy cross three consecutive sessions.  Progressing/ Not Met 10/25/2023  60% accuracy given minimal cuing (decrease)     Long Term Objectives: 3 months  Javian will:  1. Express basic wants and needs independently to familiar and unfamiliar communication partners  2. Demonstrate age-appropriate language, articulation, and pragmatic skills, as based on informal and formal measures  3.  Caregivers will demonstrate adequate implementation of HEP and therapeutic strategies to support language development and functional communication.     Patient Education/Response:   SLP and caregiver discussed plan for Clau's targets for therapy. SLP educated caregivers on strategies used in speech therapy to demonstrate carryover of skills into everyday environments. Caregiver did demonstrate understanding of all discussed this date.     Home program established:  Instructed 5/24/23 to follow SOS--speak slow, over-articulate, and speak up to improve intelligibility.  Provided 7 Strategies for Appropriate Rate of Speech, see 8/30/2023. See Instructions dated 10/11/2023 for Communication Repair Strategies for Speakers and Listeners.  Exercises were reviewed and Clau was able to demonstrate them prior to the end of the session.  Clau demonstrated good  understanding of the education provided.     See EMR under Patient Instructions for exercises provided throughout therapy.  Assessment:   Clau is progressing toward his goals. Patient requires minimal cuing to attend to task and becomes off-task easily. Patient is easily redirected when he becomes off task. Re-educated about SOS strategy with increased success utilizing strategy with clinician model and visual cue. Targeted new goals added this date to target social and articulation skills to improve communication across the natural environment. Provided verbal scenarios and visual prompts, patient identified instances of communication breakdown with increased success. Patient required decreased cuing to identify communication repair, but increased success in navigating communication repair strategies. See Objectives above for details about progress towards short-term and long-term goals. Current goals remain appropriate. Goals will be added and re-assessed as needed.      Patient prognosis is Fair. Patient will continue to benefit from skilled outpatient speech  and language therapy to address the deficits listed in the problem list on initial evaluation, provide patient/family education and to maximize patient's level of independence in the home and community environment.     Medical necessity is demonstrated by the following IMPAIRMENTS:  Able to communicate basic wants and needs, but reliant on communication partners to repair and recast to familiar and unfamiliar listeners. Communication and pragmatic deficits impact patient's ability to succeed socially  Barriers to Therapy: attention  The patient's spiritual, cultural, social, and educational needs were considered and the patient is agreeable to plan of care.   Plan:   Continue Plan of Care for 1 time every other week for 6 months to address his communication deficits on an outpatient basis with direct intervention, parent education and home programming to facilitate carry-over of learned therapy targets in therapy sessions to the home and daily environment. Therapy will be discontinued when child has met all goals, is not making progress, parent discontinues therapy, and/or for any other applicable reasons.    Thomas Cueto CCC-SLP   10/25/2023

## 2023-11-08 ENCOUNTER — CLINICAL SUPPORT (OUTPATIENT)
Dept: REHABILITATION | Facility: HOSPITAL | Age: 18
End: 2023-11-08
Payer: MEDICAID

## 2023-11-08 DIAGNOSIS — F80.2 MIXED RECEPTIVE-EXPRESSIVE LANGUAGE DISORDER: Primary | ICD-10-CM

## 2023-11-08 PROCEDURE — 92507 TX SP LANG VOICE COMM INDIV: CPT | Mod: PO

## 2023-11-08 NOTE — PROGRESS NOTES
[facility]  Pediatric Speech Therapy Treatment Note    Date: 11/8/2023    Patient Name: Clau Hernandez  MRN: [id number]  Therapy Diagnosis:   Encounter Diagnosis   Name Primary?    Mixed receptive-expressive language disorder Yes      Physician: Cali Rivera MD  Physician Orders: Evaluate and treat   Medical Diagnosis: Autism   Age: 18 y.o.    Visit # / Visits Authorized: Pending authorization.    Date of Evaluation: 5/24/2023    Plan of Care Expiration Date: 8/24/2023   New POC Certification Period: 8/30/2023-2/29/2024  Authorization Date: 7/21/2022-7/21/2023    Testing last administered: 7/19/2023    Total visits: 11    Time In: 10:15 AM  Time Out: 11:00 AM  Total Billable Time: 45 minutes     Precautions: Universal    Subjective:   Parent reports: no significant changes.  He was compliant to home exercise program.   Response to previous treatment: steady progress.  Caregiver did not attend today's session.  Pain: Clau was unable to rate pain on a numeric scale, but no pain behaviors were noted in today's session.  Objective:   UNTIMED  Procedure Min.   Speech- Language- Voice Therapy    45   Total Untimed Units: 1  Charges Billed/# of units: 1    Short Term Goals: (3 months) Current Progress:   1. Complete formal language testing.  MET 7/19/2023 Met 7/19/2023     2. Complete formal articulation testing.  Progressing/ Not Met 11/8/2023  Not addressed this session.    3. Complete formal pragmatic testing.    Progressing/ Not Met 11/8/2023  Not addressed this session.    4. Recall speech strategy SOS (Speak slow, overarticulate, and speak up) with 100% accuracy across three consecutive sessions.   Goal met 10/11/2023 Recalled 3 of 3 strategies given moderate verbal cuing. Reviewed and discussed. (3/3)      5. Utilize speech strategy SOS (Speak slow, overarticulate, and speak up) at the word, phrase, sentence, and conversation level to improve intelligibility with 80% accuracy across  three consecutive sessions.   Progressing/ Not Met 11/8/2023   Utilized inconsistently during session when cued and spontaneously. Usage of strategy increased with cuing.    6. Follow simple 1, 2, and 3 step directions given no cuing with 80% accuracy across three consecutive sessions.   Progressing/ Not Met 11/8/2023  1-step 100% accuracy (1/3)  2-step 100% accuracy (2/3)  3-step 100% accuracy (2/3)     7. Follow complex 1, 2, and 3 step directions given no cuing with 80% accuracy across three consecutive sessions.   Progressing/ Not Met 11/8/2023  1-step 90% accuracy (1/3)  3-step 70% accuracy    8. Produce 2-, 3-, and 4-syllable words at the word, phrase, sentence, and conversation level with at least 80% intelligibility across three consecutive sessions.   Progressing/ Not Met 11/8/2023  Not addressed this session.     Previous: 3-syllable words: 80% accuracy (2/3)  4-syllable words: 90% accuracy (2/3)   9. Identify and repair communication breakdowns during a 5-minute conversation with a listener for 3 of 5 opportunities across three consecutive sessions.  Progressing/ Not Met 11/8/2023  Review communication breakdown repair strategies. Identified communication breakdown with 4 of 6 trials given verbal or visual prompt (3/3).  Repaired communication breakdown with 75% accuracy (increase, 2/3)   10. Answer social inferencing questions from at least 2 different perspectives/[id number] given a real or hypothetical problem-solving scenario with at least 80% accuracy cross three consecutive sessions.  Progressing/ Not Met 11/8/2023  75% accuracy given minimal cuing (increase)     Long Term Objectives: 3 months  Javian will:  1. Express basic wants and needs independently to familiar and unfamiliar communication partners  2. Demonstrate age-appropriate language, articulation, and pragmatic skills, as based on informal and formal measures  3. Caregivers will demonstrate adequate implementation of HEP and  therapeutic strategies to support language development and functional communication.     Patient Education/Response:   SLP and caregiver discussed plan for Clau's targets for therapy. SLP educated caregivers on strategies used in speech therapy to demonstrate carryover of skills into everyday environments. Caregiver did demonstrate understanding of all discussed this date.     Home program established:  Instructed 5/24/23 to follow SOS--speak slow, over-articulate, and speak up to improve intelligibility.  Provided 7 Strategies for Appropriate Rate of Speech, see 8/30/2023. See Instructions dated 10/11/2023 for Communication Repair Strategies for Speakers and Listeners.  Exercises were reviewed and Clau was able to demonstrate them prior to the end of the session.  Clau demonstrated good  understanding of the education provided.     See EMR under Patient Instructions for exercises provided throughout therapy.  Assessment:   Clau is progressing toward his goals. Patient requires minimal cuing to attend to task and becomes off-task easily. Patient is easily redirected when he becomes off task. Re-educated about SOS strategy with increased success utilizing strategy with clinician model and visual cue. Targeted new goals added this date to target social and articulation skills to improve communication across the natural environment. Provided verbal scenarios and visual prompts, patient identified instances of communication breakdown with increased success. Patient required decreased cuing to identify communication repair, but increased success in navigating communication repair strategies. See Objectives above for details about progress towards short-term and long-term goals. Current goals remain appropriate. Goals will be added and re-assessed as needed.      Patient prognosis is Fair. Patient will continue to benefit from skilled outpatient speech and language therapy to address the deficits listed in the  problem list on initial evaluation, provide patient/family education and to maximize patient's level of independence in the home and community environment.     Medical necessity is demonstrated by the following IMPAIRMENTS:  Able to communicate basic wants and needs, but reliant on communication partners to repair and recast to familiar and unfamiliar listeners. Communication and pragmatic deficits impact patient's ability to succeed socially  Barriers to Therapy: attention  The patient's spiritual, cultural, social, and educational needs were considered and the patient is agreeable to plan of care.   Plan:   Continue Plan of Care for 1 time every other week for 6 months to address his communication deficits on an outpatient basis with direct intervention, parent education and home programming to facilitate carry-over of learned therapy targets in therapy sessions to the home and daily environment. Therapy will be discontinued when child has met all goals, is not making progress, parent discontinues therapy, and/or for any other applicable reasons.    Thomas Cueto CCC-SLP   11/8/2023

## 2023-12-06 ENCOUNTER — PATIENT MESSAGE (OUTPATIENT)
Dept: REHABILITATION | Facility: HOSPITAL | Age: 18
End: 2023-12-06

## 2024-01-23 ENCOUNTER — DOCUMENTATION ONLY (OUTPATIENT)
Dept: REHABILITATION | Facility: HOSPITAL | Age: 19
End: 2024-01-23
Payer: MEDICAID

## 2024-01-23 NOTE — PROGRESS NOTES
Outpatient Pediatric Speech Discharge Note    Patient Name: Clau Stanton  Clinic #: [id number]  Date: 1/23/2024  Age: 18 y.o.    Clau Stanton has been receiving speech therapy at [facility] since his initial evaluation on 5/24/2023. Therapy was terminated in December 2023 secondary to patient's attendance and age. Family was notified at his last well visit. CLAU STANTON's status with his goals as of his last attended session on 11/8/2023:    Short Term Objectives:   Short Term Goals: (3 months) Current Progress:   1. Complete formal language testing.  MET 7/19/2023 Met 7/19/2023      2. Complete formal articulation testing.  Progressing/ Not Met 11/8/2023  DISCHARGED   3. Complete formal pragmatic testing.    Progressing/ Not Met 11/8/2023  DISCHARGED   4. Recall speech strategy SOS (Speak slow, overarticulate, and speak up) with 100% accuracy across three consecutive sessions.   Goal met 10/11/2023 Recalled 3 of 3 strategies given moderate verbal cuing. Reviewed and discussed. (3/3)      5. Utilize speech strategy SOS (Speak slow, overarticulate, and speak up) at the word, phrase, sentence, and conversation level to improve intelligibility with 80% accuracy across three consecutive sessions.  Utilized inconsistently during session when cued and spontaneously. Usage of strategy increased with cuing.   DISCHARGED   6. Follow simple 1, 2, and 3 step directions given no cuing with 80% accuracy across three consecutive sessions.   Progressing/ Not Met 11/8/2023  1-step 100% accuracy (1/3)  2-step 100% accuracy (2/3)  3-step 100% accuracy (2/3)    DISCHARGED    7. Follow complex 1, 2, and 3 step directions given no cuing with 80% accuracy across three consecutive sessions.   Progressing/ Not Met 11/8/2023  1-step 90% accuracy (1/3)  3-step 70% accuracy     DISCHARGED   8. Produce 2-, 3-, and 4-syllable words at the word, phrase, sentence, and conversation level with at least 80%  intelligibility across three consecutive sessions.   Progressing/ Not Met 11/8/2023  DISCHARGED     Previous: 3-syllable words: 80% accuracy (2/3)  4-syllable words: 90% accuracy (2/3)   9. Identify and repair communication breakdowns during a 5-minute conversation with a listener for 3 of 5 opportunities across three consecutive sessions.  Identified 11/8/23  Review communication breakdown repair strategies.  Repaired communication breakdown with 75% accuracy (increase, 2/3)    DISCHARGED   10. Answer social inferencing questions from at least 2 different perspectives/[id number] given a real or hypothetical problem-solving scenario with at least 80% accuracy cross three consecutive sessions. 75% accuracy given minimal cuing (increase)    DISCHARGED     Long Term Objectives: 3 months  Clau will:  1. Express basic wants and needs independently to familiar and unfamiliar communication partners. MET  2. Demonstrate age-appropriate language, articulation, and pragmatic skills, as based on informal and formal measures. DISCHARGED  3. Caregivers will demonstrate adequate implementation of HEP and therapeutic strategies to support language development and functional communication. DISCHARGED    As of today, 1/23/2024, Clau Hernandez will no longer be receiving speech therapy services at [facility] secondary to patient's attendance and age.    No charges posted to patient account.

## 2024-03-12 ENCOUNTER — PATIENT MESSAGE (OUTPATIENT)
Dept: PEDIATRICS | Facility: CLINIC | Age: 19
End: 2024-03-12
Payer: MEDICAID

## 2025-02-08 NOTE — TELEPHONE ENCOUNTER
Sergio called with the [facility] personal care services he would like to know if child has improved according to his 88 L there was some improvement does he still needs 7 days a week 4 hrs aday  Number   
Spoke to Sergio with state epsdt visit no change in pt care needs  
CANDIDA Bethel

## (undated) DEVICE — SPLINT PLASTER EXT FAST 4X15

## (undated) DEVICE — BANDAGE DERMACEA STRETCH 4X1IN

## (undated) DEVICE — STOCKINET TUBULAR 1 PLY 6X60IN

## (undated) DEVICE — SUT ETHILON 3-0 PS2 18 BLK

## (undated) DEVICE — ELECTRODE REM PLYHSV RETURN 9

## (undated) DEVICE — SEE MEDLINE ITEM 157150

## (undated) DEVICE — DRAPE C-ARMOR EQUIPMENT COVER

## (undated) DEVICE — DRAPE STERI-DRAPE 1000 17X11IN

## (undated) DEVICE — PAD CAST SPECIALIST STRL 3

## (undated) DEVICE — PAD CAST SPECIALIST STRL 4

## (undated) DEVICE — APPLICATOR CHLORAPREP ORN 26ML

## (undated) DEVICE — SEE MEDLINE ITEM 146298

## (undated) DEVICE — Device

## (undated) DEVICE — SUT 2-0 VICRYL / SH (J417)

## (undated) DEVICE — DRESSING XEROFORM FOIL PK 1X8

## (undated) DEVICE — GAUZE SPONGE 4X4 12PLY

## (undated) DEVICE — TRAY MINOR ORTHO

## (undated) DEVICE — TAPE CAST DELTA PLUS 3 X4

## (undated) DEVICE — DRAPE C ARM 42 X 120 10/BX

## (undated) DEVICE — DRAPE STERI U-SHAPED 47X51IN

## (undated) DEVICE — SEE MEDLINE ITEM 152515

## (undated) DEVICE — TAPE DELTA CAST II SOFT BLUE

## (undated) DEVICE — TOURNIQUET SB QC DP 34X4IN

## (undated) DEVICE — SPONGE GAUZE 16PLY 4X4